# Patient Record
Sex: MALE | Race: OTHER | URBAN - METROPOLITAN AREA
[De-identification: names, ages, dates, MRNs, and addresses within clinical notes are randomized per-mention and may not be internally consistent; named-entity substitution may affect disease eponyms.]

---

## 2017-02-22 PROBLEM — Z00.00 ENCOUNTER FOR PREVENTIVE HEALTH EXAMINATION: Status: ACTIVE | Noted: 2017-02-22

## 2017-03-16 ENCOUNTER — OUTPATIENT (OUTPATIENT)
Dept: OUTPATIENT SERVICES | Facility: HOSPITAL | Age: 57
LOS: 1 days | Discharge: HOME | End: 2017-03-16

## 2017-03-23 ENCOUNTER — APPOINTMENT (OUTPATIENT)
Dept: SURGERY | Facility: CLINIC | Age: 57
End: 2017-03-23

## 2017-06-27 DIAGNOSIS — R52 PAIN, UNSPECIFIED: ICD-10-CM

## 2017-10-31 ENCOUNTER — OUTPATIENT (OUTPATIENT)
Dept: OUTPATIENT SERVICES | Facility: HOSPITAL | Age: 57
LOS: 1 days | Discharge: HOME | End: 2017-10-31

## 2017-10-31 DIAGNOSIS — K86.2 CYST OF PANCREAS: ICD-10-CM

## 2017-11-27 ENCOUNTER — APPOINTMENT (OUTPATIENT)
Dept: SURGERY | Facility: CLINIC | Age: 57
End: 2017-11-27

## 2019-11-06 ENCOUNTER — OUTPATIENT (OUTPATIENT)
Dept: OUTPATIENT SERVICES | Facility: HOSPITAL | Age: 59
LOS: 1 days | Discharge: HOME | End: 2019-11-06
Payer: COMMERCIAL

## 2019-11-06 DIAGNOSIS — M54.42 LUMBAGO WITH SCIATICA, LEFT SIDE: ICD-10-CM

## 2019-11-06 PROCEDURE — 72110 X-RAY EXAM L-2 SPINE 4/>VWS: CPT | Mod: 26

## 2019-11-14 ENCOUNTER — OUTPATIENT (OUTPATIENT)
Dept: OUTPATIENT SERVICES | Facility: HOSPITAL | Age: 59
LOS: 1 days | Discharge: HOME | End: 2019-11-14
Payer: COMMERCIAL

## 2019-11-14 DIAGNOSIS — Z87.442 PERSONAL HISTORY OF URINARY CALCULI: ICD-10-CM

## 2019-11-14 PROCEDURE — 76775 US EXAM ABDO BACK WALL LIM: CPT | Mod: 26

## 2021-12-21 ENCOUNTER — OUTPATIENT (OUTPATIENT)
Dept: OUTPATIENT SERVICES | Facility: HOSPITAL | Age: 61
LOS: 1 days | Discharge: HOME | End: 2021-12-21
Payer: COMMERCIAL

## 2021-12-21 DIAGNOSIS — M79.671 PAIN IN RIGHT FOOT: ICD-10-CM

## 2021-12-21 DIAGNOSIS — R10.2 PELVIC AND PERINEAL PAIN: ICD-10-CM

## 2021-12-21 PROCEDURE — 72170 X-RAY EXAM OF PELVIS: CPT | Mod: 26

## 2021-12-21 PROCEDURE — 73630 X-RAY EXAM OF FOOT: CPT | Mod: 26,RT

## 2021-12-21 PROCEDURE — 74018 RADEX ABDOMEN 1 VIEW: CPT | Mod: 26

## 2022-04-13 ENCOUNTER — OUTPATIENT (OUTPATIENT)
Dept: OUTPATIENT SERVICES | Facility: HOSPITAL | Age: 62
LOS: 1 days | Discharge: HOME | End: 2022-04-13
Payer: COMMERCIAL

## 2022-04-13 VITALS
DIASTOLIC BLOOD PRESSURE: 86 MMHG | SYSTOLIC BLOOD PRESSURE: 158 MMHG | HEART RATE: 67 BPM | HEIGHT: 68 IN | TEMPERATURE: 98 F | OXYGEN SATURATION: 97 % | RESPIRATION RATE: 15 BRPM | WEIGHT: 243.83 LBS

## 2022-04-13 DIAGNOSIS — Z90.49 ACQUIRED ABSENCE OF OTHER SPECIFIED PARTS OF DIGESTIVE TRACT: Chronic | ICD-10-CM

## 2022-04-13 DIAGNOSIS — Z01.818 ENCOUNTER FOR OTHER PREPROCEDURAL EXAMINATION: ICD-10-CM

## 2022-04-13 DIAGNOSIS — C67.9 MALIGNANT NEOPLASM OF BLADDER, UNSPECIFIED: ICD-10-CM

## 2022-04-13 DIAGNOSIS — Z90.89 ACQUIRED ABSENCE OF OTHER ORGANS: Chronic | ICD-10-CM

## 2022-04-13 LAB
A1C WITH ESTIMATED AVERAGE GLUCOSE RESULT: 8.9 % — HIGH (ref 4–5.6)
ALBUMIN SERPL ELPH-MCNC: 4.9 G/DL — SIGNIFICANT CHANGE UP (ref 3.5–5.2)
ALP SERPL-CCNC: 92 U/L — SIGNIFICANT CHANGE UP (ref 30–115)
ALT FLD-CCNC: 37 U/L — SIGNIFICANT CHANGE UP (ref 0–41)
ANION GAP SERPL CALC-SCNC: 16 MMOL/L — HIGH (ref 7–14)
APPEARANCE UR: CLEAR — SIGNIFICANT CHANGE UP
APTT BLD: 35 SEC — SIGNIFICANT CHANGE UP (ref 27–39.2)
AST SERPL-CCNC: 25 U/L — SIGNIFICANT CHANGE UP (ref 0–41)
BASOPHILS # BLD AUTO: 0.05 K/UL — SIGNIFICANT CHANGE UP (ref 0–0.2)
BASOPHILS NFR BLD AUTO: 0.6 % — SIGNIFICANT CHANGE UP (ref 0–1)
BILIRUB SERPL-MCNC: 0.2 MG/DL — SIGNIFICANT CHANGE UP (ref 0.2–1.2)
BILIRUB UR-MCNC: NEGATIVE — SIGNIFICANT CHANGE UP
BUN SERPL-MCNC: 16 MG/DL — SIGNIFICANT CHANGE UP (ref 10–20)
CALCIUM SERPL-MCNC: 10.6 MG/DL — HIGH (ref 8.5–10.1)
CHLORIDE SERPL-SCNC: 99 MMOL/L — SIGNIFICANT CHANGE UP (ref 98–110)
CO2 SERPL-SCNC: 25 MMOL/L — SIGNIFICANT CHANGE UP (ref 17–32)
COLOR SPEC: SIGNIFICANT CHANGE UP
CREAT SERPL-MCNC: 1.1 MG/DL — SIGNIFICANT CHANGE UP (ref 0.7–1.5)
DIFF PNL FLD: NEGATIVE — SIGNIFICANT CHANGE UP
EGFR: 75 ML/MIN/1.73M2 — SIGNIFICANT CHANGE UP
EOSINOPHIL # BLD AUTO: 0.38 K/UL — SIGNIFICANT CHANGE UP (ref 0–0.7)
EOSINOPHIL NFR BLD AUTO: 4.6 % — SIGNIFICANT CHANGE UP (ref 0–8)
ESTIMATED AVERAGE GLUCOSE: 209 MG/DL — HIGH (ref 68–114)
GLUCOSE SERPL-MCNC: 111 MG/DL — HIGH (ref 70–99)
GLUCOSE UR QL: NEGATIVE — SIGNIFICANT CHANGE UP
HCT VFR BLD CALC: 44 % — SIGNIFICANT CHANGE UP (ref 42–52)
HGB BLD-MCNC: 14.8 G/DL — SIGNIFICANT CHANGE UP (ref 14–18)
IMM GRANULOCYTES NFR BLD AUTO: 0.4 % — HIGH (ref 0.1–0.3)
INR BLD: 0.91 RATIO — SIGNIFICANT CHANGE UP (ref 0.65–1.3)
KETONES UR-MCNC: NEGATIVE — SIGNIFICANT CHANGE UP
LEUKOCYTE ESTERASE UR-ACNC: NEGATIVE — SIGNIFICANT CHANGE UP
LYMPHOCYTES # BLD AUTO: 3.65 K/UL — HIGH (ref 1.2–3.4)
LYMPHOCYTES # BLD AUTO: 44.4 % — SIGNIFICANT CHANGE UP (ref 20.5–51.1)
MCHC RBC-ENTMCNC: 28.2 PG — SIGNIFICANT CHANGE UP (ref 27–31)
MCHC RBC-ENTMCNC: 33.6 G/DL — SIGNIFICANT CHANGE UP (ref 32–37)
MCV RBC AUTO: 84 FL — SIGNIFICANT CHANGE UP (ref 80–94)
MONOCYTES # BLD AUTO: 0.69 K/UL — HIGH (ref 0.1–0.6)
MONOCYTES NFR BLD AUTO: 8.4 % — SIGNIFICANT CHANGE UP (ref 1.7–9.3)
NEUTROPHILS # BLD AUTO: 3.42 K/UL — SIGNIFICANT CHANGE UP (ref 1.4–6.5)
NEUTROPHILS NFR BLD AUTO: 41.6 % — LOW (ref 42.2–75.2)
NITRITE UR-MCNC: NEGATIVE — SIGNIFICANT CHANGE UP
NRBC # BLD: 0 /100 WBCS — SIGNIFICANT CHANGE UP (ref 0–0)
PH UR: 6 — SIGNIFICANT CHANGE UP (ref 5–8)
PLATELET # BLD AUTO: 399 K/UL — SIGNIFICANT CHANGE UP (ref 130–400)
POTASSIUM SERPL-MCNC: 4.5 MMOL/L — SIGNIFICANT CHANGE UP (ref 3.5–5)
POTASSIUM SERPL-SCNC: 4.5 MMOL/L — SIGNIFICANT CHANGE UP (ref 3.5–5)
PROT SERPL-MCNC: 8 G/DL — SIGNIFICANT CHANGE UP (ref 6–8)
PROT UR-MCNC: SIGNIFICANT CHANGE UP
PROTHROM AB SERPL-ACNC: 10.5 SEC — SIGNIFICANT CHANGE UP (ref 9.95–12.87)
RBC # BLD: 5.24 M/UL — SIGNIFICANT CHANGE UP (ref 4.7–6.1)
RBC # FLD: 12.4 % — SIGNIFICANT CHANGE UP (ref 11.5–14.5)
SODIUM SERPL-SCNC: 140 MMOL/L — SIGNIFICANT CHANGE UP (ref 135–146)
SP GR SPEC: 1.02 — SIGNIFICANT CHANGE UP (ref 1.01–1.03)
UROBILINOGEN FLD QL: SIGNIFICANT CHANGE UP
WBC # BLD: 8.22 K/UL — SIGNIFICANT CHANGE UP (ref 4.8–10.8)
WBC # FLD AUTO: 8.22 K/UL — SIGNIFICANT CHANGE UP (ref 4.8–10.8)

## 2022-04-13 PROCEDURE — 93010 ELECTROCARDIOGRAM REPORT: CPT

## 2022-04-13 NOTE — H&P PST ADULT - HISTORY OF PRESENT ILLNESS
PT PRESENTS TO PAST WITH NO SOB, CP, PALPITATIONS, DYSURIA, UTI OR URI AT PRESENT.   PT ABLE TO WALK UP 2-3 FLIGHTS OF STEPS WITH NO SOB.  AS PER THE PT, THIS IS HIS/HER COMPLETE MEDICAL AND SURGICAL HX, INCLUDING MEDICATIONS PRESCRIBED AND OVER THE COUNTER  pt denies any covid s/s, or tested positive in the past--PT AWARE OF DATE AND TIME OF COVID TESTING PRIOR TO SURGERY.   pt advised self quarantine till day of procedure  denies travel outside the [place] in the past 30 days  Anesthesia Alert  NO--Difficult Airway  NO--History of neck surgery or radiation  NO--Limited ROM of neck  NO--History of Malignant hyperthermia  NO--Personal or family history of Pseudocholinesterase deficiency  NO--Prior Anesthesia Complication  NO--Latex Allergy  NO--Loose teeth  NO--History of Rheumatoid Arthritis  NO--RUBIA  NO BLEEDING RISK  NO--Other_____

## 2022-04-13 NOTE — H&P PST ADULT - NSANTHOSAYNRD_GEN_A_CORE
No. RUBIA screening performed.  STOP BANG Legend: 0-2 = LOW Risk; 3-4 = INTERMEDIATE Risk; 5-8 = HIGH Risk
hemorrhoids

## 2022-04-13 NOTE — H&P PST ADULT - REASON FOR ADMISSION
Procedure: Cystourethroscopy, with fulguration and/or resection OF LARGE BLADDER TUMOR  Laterality: N/A Length of Procedure: 60 Minutes Anesthesia Type: General  PT REPORTS-  I HAVE A TUMOR ON MY BLADDER.  ABOUT 3 MONTHS AGO I STARTED HAVING BLOOD IN MY URINE.  PT DENIES HAVING ANY PAIN.
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
## 2022-04-13 NOTE — H&P PST ADULT - NSICDXPASTMEDICALHX_GEN_ALL_CORE_FT
PAST MEDICAL HISTORY:  DM (diabetes mellitus)     HTN (hypertension)     Hypercholesterolemia NO MEDS

## 2022-04-14 LAB
CULTURE RESULTS: NO GROWTH — SIGNIFICANT CHANGE UP
SPECIMEN SOURCE: SIGNIFICANT CHANGE UP

## 2022-04-27 ENCOUNTER — RESULT REVIEW (OUTPATIENT)
Age: 62
End: 2022-04-27

## 2022-04-27 ENCOUNTER — OUTPATIENT (OUTPATIENT)
Dept: OUTPATIENT SERVICES | Facility: HOSPITAL | Age: 62
LOS: 1 days | Discharge: HOME | End: 2022-04-27
Payer: COMMERCIAL

## 2022-04-27 ENCOUNTER — TRANSCRIPTION ENCOUNTER (OUTPATIENT)
Age: 62
End: 2022-04-27

## 2022-04-27 VITALS — HEART RATE: 78 BPM | DIASTOLIC BLOOD PRESSURE: 80 MMHG | SYSTOLIC BLOOD PRESSURE: 158 MMHG | RESPIRATION RATE: 18 BRPM

## 2022-04-27 VITALS
OXYGEN SATURATION: 98 % | TEMPERATURE: 98 F | HEART RATE: 81 BPM | RESPIRATION RATE: 18 BRPM | DIASTOLIC BLOOD PRESSURE: 87 MMHG | HEIGHT: 68 IN | WEIGHT: 240.08 LBS | SYSTOLIC BLOOD PRESSURE: 171 MMHG

## 2022-04-27 DIAGNOSIS — Z98.49 CATARACT EXTRACTION STATUS, UNSPECIFIED EYE: Chronic | ICD-10-CM

## 2022-04-27 DIAGNOSIS — Z90.49 ACQUIRED ABSENCE OF OTHER SPECIFIED PARTS OF DIGESTIVE TRACT: Chronic | ICD-10-CM

## 2022-04-27 DIAGNOSIS — Z90.89 ACQUIRED ABSENCE OF OTHER ORGANS: Chronic | ICD-10-CM

## 2022-04-27 PROCEDURE — 88307 TISSUE EXAM BY PATHOLOGIST: CPT | Mod: 26

## 2022-04-27 RX ORDER — HYDROMORPHONE HYDROCHLORIDE 2 MG/ML
0.5 INJECTION INTRAMUSCULAR; INTRAVENOUS; SUBCUTANEOUS
Refills: 0 | Status: DISCONTINUED | OUTPATIENT
Start: 2022-04-27 | End: 2022-04-27

## 2022-04-27 RX ORDER — SODIUM CHLORIDE 9 MG/ML
1000 INJECTION, SOLUTION INTRAVENOUS
Refills: 0 | Status: DISCONTINUED | OUTPATIENT
Start: 2022-04-27 | End: 2022-04-27

## 2022-04-27 RX ORDER — ONDANSETRON 8 MG/1
4 TABLET, FILM COATED ORAL ONCE
Refills: 0 | Status: DISCONTINUED | OUTPATIENT
Start: 2022-04-27 | End: 2022-04-27

## 2022-04-27 RX ADMIN — HYDROMORPHONE HYDROCHLORIDE 0.5 MILLIGRAM(S): 2 INJECTION INTRAMUSCULAR; INTRAVENOUS; SUBCUTANEOUS at 12:26

## 2022-04-27 RX ADMIN — HYDROMORPHONE HYDROCHLORIDE 0.5 MILLIGRAM(S): 2 INJECTION INTRAMUSCULAR; INTRAVENOUS; SUBCUTANEOUS at 12:10

## 2022-04-27 RX ADMIN — HYDROMORPHONE HYDROCHLORIDE 0.5 MILLIGRAM(S): 2 INJECTION INTRAMUSCULAR; INTRAVENOUS; SUBCUTANEOUS at 12:12

## 2022-04-27 RX ADMIN — SODIUM CHLORIDE 75 MILLILITER(S): 9 INJECTION, SOLUTION INTRAVENOUS at 11:54

## 2022-04-27 RX ADMIN — HYDROMORPHONE HYDROCHLORIDE 0.5 MILLIGRAM(S): 2 INJECTION INTRAMUSCULAR; INTRAVENOUS; SUBCUTANEOUS at 11:53

## 2022-04-27 NOTE — PRE-ANESTHESIA EVALUATION ADULT - NSANTHOSAYNRD_GEN_A_CORE
No. RUBIA screening performed.  STOP BANG Legend: 0-2 = LOW Risk; 3-4 = INTERMEDIATE Risk; 5-8 = HIGH Risk

## 2022-04-27 NOTE — ASU DISCHARGE PLAN (ADULT/PEDIATRIC) - CARE PROVIDER_API CALL
Jose Null)  Urology  [facility] [address] [address]  Phone: [phone]  Fax: [phone]  Follow Up Time:

## 2022-04-27 NOTE — ASU PATIENT PROFILE, ADULT - FALL HARM RISK - HARM RISK INTERVENTIONS

## 2022-04-27 NOTE — BRIEF OPERATIVE NOTE - NSICDXBRIEFPOSTOP_GEN_ALL_CORE_FT
POST-OP DIAGNOSIS:  Gross hematuria 27-Apr-2022 11:36:29  Jose Null  Bladder cancer 27-Apr-2022 11:36:41  Jose Null

## 2022-04-27 NOTE — ASU PATIENT PROFILE, ADULT - NSICDXPASTSURGICALHX_GEN_ALL_CORE_FT
PAST SURGICAL HISTORY:  H/O cataract extraction b/l eyes with b/l iol's    History of appendectomy     History of cholecystectomy     History of tonsillectomy

## 2022-04-27 NOTE — BRIEF OPERATIVE NOTE - NSICDXBRIEFPREOP_GEN_ALL_CORE_FT
PRE-OP DIAGNOSIS:  Gross hematuria 27-Apr-2022 11:35:51  Jose Null  Bladder cancer 27-Apr-2022 11:36:16  Jose Null

## 2022-04-27 NOTE — ASU DISCHARGE PLAN (ADULT/PEDIATRIC) - ASU DC SPECIAL INSTRUCTIONSFT
You may experience urinary frequency and urgency, some blood in your urine, lower abdominal cramps.  Take your Rx as prescribed

## 2022-04-28 LAB — GLUCOSE BLDC GLUCOMTR-MCNC: 125 MG/DL — HIGH (ref 70–99)

## 2022-04-29 LAB — SURGICAL PATHOLOGY STUDY: SIGNIFICANT CHANGE UP

## 2022-05-04 DIAGNOSIS — D09.0 CARCINOMA IN SITU OF BLADDER: ICD-10-CM

## 2022-05-04 DIAGNOSIS — C67.9 MALIGNANT NEOPLASM OF BLADDER, UNSPECIFIED: ICD-10-CM

## 2022-05-04 DIAGNOSIS — R31.0 GROSS HEMATURIA: ICD-10-CM

## 2022-05-04 DIAGNOSIS — I10 ESSENTIAL (PRIMARY) HYPERTENSION: ICD-10-CM

## 2022-05-04 DIAGNOSIS — E11.9 TYPE 2 DIABETES MELLITUS WITHOUT COMPLICATIONS: ICD-10-CM

## 2022-05-04 DIAGNOSIS — Z79.84 LONG TERM (CURRENT) USE OF ORAL HYPOGLYCEMIC DRUGS: ICD-10-CM

## 2022-05-04 DIAGNOSIS — Z90.49 ACQUIRED ABSENCE OF OTHER SPECIFIED PARTS OF DIGESTIVE TRACT: ICD-10-CM

## 2022-05-04 DIAGNOSIS — E78.00 PURE HYPERCHOLESTEROLEMIA, UNSPECIFIED: ICD-10-CM

## 2023-03-31 ENCOUNTER — OUTPATIENT (OUTPATIENT)
Dept: OUTPATIENT SERVICES | Facility: HOSPITAL | Age: 63
LOS: 1 days | End: 2023-03-31
Payer: COMMERCIAL

## 2023-03-31 DIAGNOSIS — Z90.89 ACQUIRED ABSENCE OF OTHER ORGANS: Chronic | ICD-10-CM

## 2023-03-31 DIAGNOSIS — Z98.49 CATARACT EXTRACTION STATUS, UNSPECIFIED EYE: Chronic | ICD-10-CM

## 2023-03-31 DIAGNOSIS — N21.0 CALCULUS IN BLADDER: ICD-10-CM

## 2023-03-31 DIAGNOSIS — Z90.49 ACQUIRED ABSENCE OF OTHER SPECIFIED PARTS OF DIGESTIVE TRACT: Chronic | ICD-10-CM

## 2023-03-31 DIAGNOSIS — N20.0 CALCULUS OF KIDNEY: ICD-10-CM

## 2023-03-31 PROBLEM — E11.9 TYPE 2 DIABETES MELLITUS WITHOUT COMPLICATIONS: Chronic | Status: ACTIVE | Noted: 2022-04-13

## 2023-03-31 PROBLEM — I10 ESSENTIAL (PRIMARY) HYPERTENSION: Chronic | Status: ACTIVE | Noted: 2022-04-13

## 2023-03-31 PROCEDURE — 74019 RADEX ABDOMEN 2 VIEWS: CPT | Mod: 26

## 2023-03-31 PROCEDURE — 74019 RADEX ABDOMEN 2 VIEWS: CPT

## 2023-04-01 DIAGNOSIS — N20.0 CALCULUS OF KIDNEY: ICD-10-CM

## 2023-04-01 DIAGNOSIS — N21.0 CALCULUS IN BLADDER: ICD-10-CM

## 2023-08-08 ENCOUNTER — INPATIENT (INPATIENT)
Facility: HOSPITAL | Age: 63
LOS: 2 days | Discharge: HOME | DRG: 689 | End: 2023-08-11
Attending: INTERNAL MEDICINE | Admitting: HOSPITALIST
Payer: COMMERCIAL

## 2023-08-08 VITALS
HEART RATE: 82 BPM | HEIGHT: 68 IN | WEIGHT: 231.93 LBS | DIASTOLIC BLOOD PRESSURE: 74 MMHG | RESPIRATION RATE: 18 BRPM | SYSTOLIC BLOOD PRESSURE: 132 MMHG | TEMPERATURE: 99 F | OXYGEN SATURATION: 99 %

## 2023-08-08 DIAGNOSIS — Z90.49 ACQUIRED ABSENCE OF OTHER SPECIFIED PARTS OF DIGESTIVE TRACT: Chronic | ICD-10-CM

## 2023-08-08 DIAGNOSIS — Z98.49 CATARACT EXTRACTION STATUS, UNSPECIFIED EYE: Chronic | ICD-10-CM

## 2023-08-08 DIAGNOSIS — N39.0 URINARY TRACT INFECTION, SITE NOT SPECIFIED: ICD-10-CM

## 2023-08-08 DIAGNOSIS — Z90.89 ACQUIRED ABSENCE OF OTHER ORGANS: Chronic | ICD-10-CM

## 2023-08-08 LAB
ALBUMIN SERPL ELPH-MCNC: 3.6 G/DL — SIGNIFICANT CHANGE UP (ref 3.5–5.2)
ALP SERPL-CCNC: 85 U/L — SIGNIFICANT CHANGE UP (ref 30–115)
ALT FLD-CCNC: 46 U/L — HIGH (ref 0–41)
ANION GAP SERPL CALC-SCNC: 19 MMOL/L — HIGH (ref 7–14)
APPEARANCE UR: CLEAR — SIGNIFICANT CHANGE UP
AST SERPL-CCNC: 44 U/L — HIGH (ref 0–41)
B-OH-BUTYR SERPL-SCNC: 0.6 MMOL/L — HIGH
BACTERIA # UR AUTO: ABNORMAL /HPF
BASE EXCESS BLDV CALC-SCNC: -2.5 MMOL/L — LOW (ref -2–3)
BASOPHILS # BLD AUTO: 0.02 K/UL — SIGNIFICANT CHANGE UP (ref 0–0.2)
BASOPHILS NFR BLD AUTO: 0.2 % — SIGNIFICANT CHANGE UP (ref 0–1)
BILIRUB SERPL-MCNC: 0.3 MG/DL — SIGNIFICANT CHANGE UP (ref 0.2–1.2)
BILIRUB UR-MCNC: NEGATIVE — SIGNIFICANT CHANGE UP
BUN SERPL-MCNC: 27 MG/DL — HIGH (ref 10–20)
CA-I SERPL-SCNC: 1.13 MMOL/L — LOW (ref 1.15–1.33)
CALCIUM SERPL-MCNC: 9.1 MG/DL — SIGNIFICANT CHANGE UP (ref 8.4–10.5)
CAST: 6 /LPF — HIGH (ref 0–4)
CHLORIDE SERPL-SCNC: 91 MMOL/L — LOW (ref 98–110)
CO2 SERPL-SCNC: 20 MMOL/L — SIGNIFICANT CHANGE UP (ref 17–32)
COLOR SPEC: SIGNIFICANT CHANGE UP
CREAT SERPL-MCNC: 1.4 MG/DL — SIGNIFICANT CHANGE UP (ref 0.7–1.5)
DIFF PNL FLD: ABNORMAL
EGFR: 56 ML/MIN/1.73M2 — LOW
EOSINOPHIL # BLD AUTO: 0.09 K/UL — SIGNIFICANT CHANGE UP (ref 0–0.7)
EOSINOPHIL NFR BLD AUTO: 1 % — SIGNIFICANT CHANGE UP (ref 0–8)
GAS PNL BLDV: 125 MMOL/L — LOW (ref 136–145)
GAS PNL BLDV: SIGNIFICANT CHANGE UP
GAS PNL BLDV: SIGNIFICANT CHANGE UP
GLUCOSE BLDC GLUCOMTR-MCNC: 103 MG/DL — HIGH (ref 70–99)
GLUCOSE BLDC GLUCOMTR-MCNC: 118 MG/DL — HIGH (ref 70–99)
GLUCOSE BLDC GLUCOMTR-MCNC: 161 MG/DL — HIGH (ref 70–99)
GLUCOSE BLDC GLUCOMTR-MCNC: 240 MG/DL — HIGH (ref 70–99)
GLUCOSE BLDC GLUCOMTR-MCNC: 321 MG/DL — HIGH (ref 70–99)
GLUCOSE SERPL-MCNC: 498 MG/DL — CRITICAL HIGH (ref 70–99)
GLUCOSE UR QL: >=1000 MG/DL
HCO3 BLDV-SCNC: 24 MMOL/L — SIGNIFICANT CHANGE UP (ref 22–29)
HCT VFR BLD CALC: 36.7 % — LOW (ref 42–52)
HCT VFR BLDA CALC: 36 % — LOW (ref 39–51)
HGB BLD CALC-MCNC: 12.1 G/DL — LOW (ref 12.6–17.4)
HGB BLD-MCNC: 11.9 G/DL — LOW (ref 14–18)
HYALINE CASTS # UR AUTO: 6 /LPF — HIGH (ref 0–4)
IMM GRANULOCYTES NFR BLD AUTO: 0.7 % — HIGH (ref 0.1–0.3)
KETONES UR-MCNC: ABNORMAL MG/DL
LACTATE BLDV-MCNC: 4.4 MMOL/L — CRITICAL HIGH (ref 0.5–2)
LEUKOCYTE ESTERASE UR-ACNC: NEGATIVE — SIGNIFICANT CHANGE UP
LYMPHOCYTES # BLD AUTO: 1.33 K/UL — SIGNIFICANT CHANGE UP (ref 1.2–3.4)
LYMPHOCYTES # BLD AUTO: 14.9 % — LOW (ref 20.5–51.1)
MAGNESIUM SERPL-MCNC: 2.2 MG/DL — SIGNIFICANT CHANGE UP (ref 1.8–2.4)
MCHC RBC-ENTMCNC: 27.7 PG — SIGNIFICANT CHANGE UP (ref 27–31)
MCHC RBC-ENTMCNC: 32.4 G/DL — SIGNIFICANT CHANGE UP (ref 32–37)
MCV RBC AUTO: 85.3 FL — SIGNIFICANT CHANGE UP (ref 80–94)
MONOCYTES # BLD AUTO: 0.48 K/UL — SIGNIFICANT CHANGE UP (ref 0.1–0.6)
MONOCYTES NFR BLD AUTO: 5.4 % — SIGNIFICANT CHANGE UP (ref 1.7–9.3)
NEUTROPHILS # BLD AUTO: 6.95 K/UL — HIGH (ref 1.4–6.5)
NEUTROPHILS NFR BLD AUTO: 77.8 % — HIGH (ref 42.2–75.2)
NITRITE UR-MCNC: POSITIVE
NRBC # BLD: 0 /100 WBCS — SIGNIFICANT CHANGE UP (ref 0–0)
OSMOLALITY SERPL: 304 MOS/KG — HIGH (ref 280–301)
PCO2 BLDV: 51 MMHG — SIGNIFICANT CHANGE UP (ref 42–55)
PH BLDV: 7.29 — LOW (ref 7.32–7.43)
PH UR: 6 — SIGNIFICANT CHANGE UP (ref 5–8)
PHOSPHATE SERPL-MCNC: 3.5 MG/DL — SIGNIFICANT CHANGE UP (ref 2.1–4.9)
PLATELET # BLD AUTO: 363 K/UL — SIGNIFICANT CHANGE UP (ref 130–400)
PMV BLD: 11.8 FL — HIGH (ref 7.4–10.4)
PO2 BLDV: 22 MMHG — SIGNIFICANT CHANGE UP
POTASSIUM BLDV-SCNC: 4.9 MMOL/L — SIGNIFICANT CHANGE UP (ref 3.5–5.1)
POTASSIUM SERPL-MCNC: 5 MMOL/L — SIGNIFICANT CHANGE UP (ref 3.5–5)
POTASSIUM SERPL-SCNC: 5 MMOL/L — SIGNIFICANT CHANGE UP (ref 3.5–5)
PROT SERPL-MCNC: 7.4 G/DL — SIGNIFICANT CHANGE UP (ref 6–8)
PROT UR-MCNC: 30 MG/DL
RBC # BLD: 4.3 M/UL — LOW (ref 4.7–6.1)
RBC # FLD: 13.2 % — SIGNIFICANT CHANGE UP (ref 11.5–14.5)
RBC CASTS # UR COMP ASSIST: 5 /HPF — HIGH (ref 0–4)
SAO2 % BLDV: 27.4 % — SIGNIFICANT CHANGE UP
SODIUM SERPL-SCNC: 130 MMOL/L — LOW (ref 135–146)
SP GR SPEC: >1.03 — HIGH (ref 1–1.03)
SQUAMOUS # UR AUTO: 2 /HPF — SIGNIFICANT CHANGE UP (ref 0–5)
UROBILINOGEN FLD QL: 1 MG/DL — SIGNIFICANT CHANGE UP (ref 0.2–1)
WBC # BLD: 8.93 K/UL — SIGNIFICANT CHANGE UP (ref 4.8–10.8)
WBC # FLD AUTO: 8.93 K/UL — SIGNIFICANT CHANGE UP (ref 4.8–10.8)
WBC UR QL: 10 /HPF — HIGH (ref 0–5)

## 2023-08-08 PROCEDURE — 80053 COMPREHEN METABOLIC PANEL: CPT

## 2023-08-08 PROCEDURE — 83036 HEMOGLOBIN GLYCOSYLATED A1C: CPT

## 2023-08-08 PROCEDURE — 99291 CRITICAL CARE FIRST HOUR: CPT

## 2023-08-08 PROCEDURE — 82962 GLUCOSE BLOOD TEST: CPT

## 2023-08-08 PROCEDURE — 76770 US EXAM ABDO BACK WALL COMP: CPT

## 2023-08-08 PROCEDURE — 84540 ASSAY OF URINE/UREA-N: CPT

## 2023-08-08 PROCEDURE — 82010 KETONE BODYS QUAN: CPT

## 2023-08-08 PROCEDURE — 84300 ASSAY OF URINE SODIUM: CPT

## 2023-08-08 PROCEDURE — 80061 LIPID PANEL: CPT

## 2023-08-08 PROCEDURE — 84156 ASSAY OF PROTEIN URINE: CPT

## 2023-08-08 PROCEDURE — 86900 BLOOD TYPING SEROLOGIC ABO: CPT

## 2023-08-08 PROCEDURE — 83930 ASSAY OF BLOOD OSMOLALITY: CPT

## 2023-08-08 PROCEDURE — 99283 EMERGENCY DEPT VISIT LOW MDM: CPT

## 2023-08-08 PROCEDURE — 83935 ASSAY OF URINE OSMOLALITY: CPT

## 2023-08-08 PROCEDURE — 82570 ASSAY OF URINE CREATININE: CPT

## 2023-08-08 PROCEDURE — 85025 COMPLETE CBC W/AUTO DIFF WBC: CPT

## 2023-08-08 PROCEDURE — 86901 BLOOD TYPING SEROLOGIC RH(D): CPT

## 2023-08-08 PROCEDURE — 36415 COLL VENOUS BLD VENIPUNCTURE: CPT

## 2023-08-08 PROCEDURE — 84133 ASSAY OF URINE POTASSIUM: CPT

## 2023-08-08 PROCEDURE — 86850 RBC ANTIBODY SCREEN: CPT

## 2023-08-08 PROCEDURE — 83735 ASSAY OF MAGNESIUM: CPT

## 2023-08-08 PROCEDURE — 86803 HEPATITIS C AB TEST: CPT

## 2023-08-08 RX ORDER — LANOLIN ALCOHOL/MO/W.PET/CERES
3 CREAM (GRAM) TOPICAL AT BEDTIME
Refills: 0 | Status: DISCONTINUED | OUTPATIENT
Start: 2023-08-08 | End: 2023-08-11

## 2023-08-08 RX ORDER — METFORMIN HYDROCHLORIDE 850 MG/1
1 TABLET ORAL
Qty: 0 | Refills: 0 | DISCHARGE

## 2023-08-08 RX ORDER — PANTOPRAZOLE SODIUM 20 MG/1
40 TABLET, DELAYED RELEASE ORAL
Refills: 0 | Status: DISCONTINUED | OUTPATIENT
Start: 2023-08-08 | End: 2023-08-09

## 2023-08-08 RX ORDER — ACETAMINOPHEN 500 MG
650 TABLET ORAL EVERY 6 HOURS
Refills: 0 | Status: DISCONTINUED | OUTPATIENT
Start: 2023-08-08 | End: 2023-08-11

## 2023-08-08 RX ORDER — CEFEPIME 1 G/1
2000 INJECTION, POWDER, FOR SOLUTION INTRAMUSCULAR; INTRAVENOUS ONCE
Refills: 0 | Status: COMPLETED | OUTPATIENT
Start: 2023-08-08 | End: 2023-08-08

## 2023-08-08 RX ORDER — INSULIN HUMAN 100 [IU]/ML
5 INJECTION, SOLUTION SUBCUTANEOUS
Qty: 100 | Refills: 0 | Status: DISCONTINUED | OUTPATIENT
Start: 2023-08-08 | End: 2023-08-09

## 2023-08-08 RX ORDER — SODIUM CHLORIDE 9 MG/ML
1000 INJECTION INTRAMUSCULAR; INTRAVENOUS; SUBCUTANEOUS ONCE
Refills: 0 | Status: COMPLETED | OUTPATIENT
Start: 2023-08-08 | End: 2023-08-08

## 2023-08-08 RX ORDER — SODIUM CHLORIDE 9 MG/ML
1000 INJECTION INTRAMUSCULAR; INTRAVENOUS; SUBCUTANEOUS
Refills: 0 | Status: DISCONTINUED | OUTPATIENT
Start: 2023-08-08 | End: 2023-08-09

## 2023-08-08 RX ORDER — INSULIN HUMAN 100 [IU]/ML
10 INJECTION, SOLUTION SUBCUTANEOUS
Qty: 100 | Refills: 0 | Status: DISCONTINUED | OUTPATIENT
Start: 2023-08-08 | End: 2023-08-08

## 2023-08-08 RX ORDER — CIPROFLOXACIN LACTATE 400MG/40ML
1 VIAL (ML) INTRAVENOUS
Refills: 0 | DISCHARGE

## 2023-08-08 RX ORDER — HEPARIN SODIUM 5000 [USP'U]/ML
5000 INJECTION INTRAVENOUS; SUBCUTANEOUS EVERY 12 HOURS
Refills: 0 | Status: DISCONTINUED | OUTPATIENT
Start: 2023-08-08 | End: 2023-08-09

## 2023-08-08 RX ORDER — SITAGLIPTIN 50 MG/1
1 TABLET, FILM COATED ORAL
Qty: 0 | Refills: 0 | DISCHARGE

## 2023-08-08 RX ORDER — CEFTRIAXONE 500 MG/1
1000 INJECTION, POWDER, FOR SOLUTION INTRAMUSCULAR; INTRAVENOUS EVERY 24 HOURS
Refills: 0 | Status: DISCONTINUED | OUTPATIENT
Start: 2023-08-08 | End: 2023-08-09

## 2023-08-08 RX ORDER — SODIUM CHLORIDE 9 MG/ML
1000 INJECTION, SOLUTION INTRAVENOUS ONCE
Refills: 0 | Status: COMPLETED | OUTPATIENT
Start: 2023-08-08 | End: 2023-08-08

## 2023-08-08 RX ADMIN — HEPARIN SODIUM 5000 UNIT(S): 5000 INJECTION INTRAVENOUS; SUBCUTANEOUS at 22:20

## 2023-08-08 RX ADMIN — CEFTRIAXONE 100 MILLIGRAM(S): 500 INJECTION, POWDER, FOR SOLUTION INTRAMUSCULAR; INTRAVENOUS at 22:10

## 2023-08-08 RX ADMIN — INSULIN HUMAN 10 UNIT(S)/HR: 100 INJECTION, SOLUTION SUBCUTANEOUS at 19:16

## 2023-08-08 RX ADMIN — Medication 5 MILLIGRAM(S): at 22:20

## 2023-08-08 RX ADMIN — INSULIN HUMAN 5 UNIT(S)/HR: 100 INJECTION, SOLUTION SUBCUTANEOUS at 20:46

## 2023-08-08 RX ADMIN — SODIUM CHLORIDE 100 MILLILITER(S): 9 INJECTION INTRAMUSCULAR; INTRAVENOUS; SUBCUTANEOUS at 20:45

## 2023-08-08 RX ADMIN — SODIUM CHLORIDE 1000 MILLILITER(S): 9 INJECTION INTRAMUSCULAR; INTRAVENOUS; SUBCUTANEOUS at 13:03

## 2023-08-08 RX ADMIN — SODIUM CHLORIDE 1000 MILLILITER(S): 9 INJECTION, SOLUTION INTRAVENOUS at 14:57

## 2023-08-08 RX ADMIN — Medication 3 MILLIGRAM(S): at 22:21

## 2023-08-08 RX ADMIN — CEFEPIME 100 MILLIGRAM(S): 1 INJECTION, POWDER, FOR SOLUTION INTRAMUSCULAR; INTRAVENOUS at 14:56

## 2023-08-08 NOTE — H&P ADULT - NSHPPHYSICALEXAM_GEN_ALL_CORE
CONSTITUTIONAL: Mild distress  EYES: PERRLA and symmetric, EOMI, No conjunctival or scleral injection, non-icteric  ENMT: Oral mucosa with moist membranes.    RESP: No respiratory distress, no use of accessory muscles;   CV: RRR, +S1S2, no MRG; no JVD; no peripheral edema  GI: Soft, NT,  LYMPH: No cervical LAD or tenderness; s  MSK: No digital clubbing or cyanosis  SKIN: No rashes or ulcers noted; no subcutaneous nodules or induration palpable  NEURO: CN II-XII intact; normal reflexes in upper and lower extremities, sensation intact in upper and lower extremities b/l to light touch   PSYCH: Appropriate insight/judgment; A+O x 3

## 2023-08-08 NOTE — ED PROVIDER NOTE - ATTENDING CONTRIBUTION TO CARE
64 yo M pmh of HTn, HLD, DM, bladder ca in active treatment presents with elevated FS and UTI. Patient was diagnosed with UTI 5 days ago. Has been on cipro for it. States that the symptoms have persisted. + hematuria, frequency, burning, urgency. + fever and chills. Tmax 101 today. Went to pmd Dr. Spencer who sent him to the ED for evaluation. Also found ot have elevated FS in office. no abdominal pain. no back pain. urologist is Dr. Null.       CONSTITUTIONAL: Well-developed; well-nourished; in no acute distress.   SKIN: warm, dry  HEAD: Normocephalic; atraumatic.  EYES: PERRL, EOMI, no conjunctival erythema  ENT: No nasal discharge; airway clear.  NECK: Supple; non tender.  CARD: S1, S2 normal;  Regular rate and rhythm.   RESP: No wheezes, rales or rhonchi.  ABD: soft non tender, non distended, no rebound or guarding. no cva tenderness.   EXT: Normal ROM.  5/5 strength in all 4 extremities   LYMPH: No acute cervical adenopathy.  NEURO: Alert, oriented, grossly unremarkable. neurovascularly intact  PSYCH: Cooperative, appropriate.

## 2023-08-08 NOTE — ED PROVIDER NOTE - OBJECTIVE STATEMENT
63-year-old male, with past medical history of HTN, HLD, DM type II, bladder cancer status postresection currently undergoing BCG therapy, presents to the emergency department for hyperglycemia measuring 500 at his doctor's office today.  Patient has had dysuria, hematuria, fever, chills, lower abdominal and lower back pain for 1.5 weeks.  He has finished 8 out of 10 days of Cipro without improvement of his symptoms.  Outpatient culture results show resistance to Cipro but sensitivity to cefepime.  Denies nausea, vomiting, diarrhea, chest pain, shortness of breath.

## 2023-08-08 NOTE — CONSULT NOTE ADULT - ASSESSMENT
Pt is a 64yo Male w/ PMH including DM, bladder ca (follows w/ Dr. Null, on BCG treatment - last 8/3) who presents with persistent dysuria, urgency, frequency while on Cipro PO a/w UTI, DKA.    PLAN:  -Medical admission for abx, DKA management  -C/w abx - s/p cefepime in ED  -UA noted, f/u UCx  -RBUS  -Trend fever curve  -Will d/w attending Pt is a 62yo Male w/ PMH including DM, bladder ca (follows w/ Dr. Null, on BCG treatment - last 8/3) who presents with persistent dysuria, urgency, frequency while on Cipro PO a/w UTI, DKA.    PLAN:  -Medical admission for abx, DKA management  -C/w abx - s/p cefepime in ED  -UA noted, f/u UCx  -RBUS  -Trend fever curve  -Will d/w attending  ADDENDUM: Pt seen and examined by Dr Gramajo, due to urgent Operating room procedure this note will be updated later.  It is recommended to treat Urinary tract infection and pt should follow up with Dr Null to see when his next BCG treatment will be Pt is a 64yo Male w/ PMH including DM, bladder ca (follows w/ Dr. Null, on BCG treatment - last 8/3) who presents with persistent dysuria, urgency, frequency while on Cipro PO a/w UTI, DKA.    PLAN:  -Medical admission for abx, DKA management  -C/w abx - s/p cefepime in ED  -UA noted, f/u UCx  -RBUS  -Trend fever curve  -Will d/w attending  ADDENDUM: Pt seen and examined by Dr Gramajo, due to urgent Operating room procedure this note will be updated later.  It is recommended to treat Urinary tract infection and pt should follow up with Dr Null to see when his next BCG treatment will be      Patient was seen in the cardiac care unit On the evening of August 9  where he is being monitored for his DKA neurologically he is stable the urine is now clear and he said the dysuria is much improved.  Antibiotics should be treated according to the culture and ID recommendations if more acute urologic care is needed as an inpatient please call us otherwise he should follow-up with Dr. Null his primary urologist

## 2023-08-08 NOTE — ED ADULT NURSE NOTE - NSFALLUNIVINTERV_ED_ALL_ED
Bed/Stretcher in lowest position, wheels locked, appropriate side rails in place/Call bell, personal items and telephone in reach/Instruct patient to call for assistance before getting out of bed/chair/stretcher/Non-slip footwear applied when patient is off stretcher/[place] to call system/Physically safe environment - no spills, clutter or unnecessary equipment/Purposeful proactive rounding/Room/bathroom lighting operational, light cord in reach

## 2023-08-08 NOTE — ED PROVIDER NOTE - CARE PLAN
1 Principal Discharge DX:	DKA (diabetic ketoacidosis)  Secondary Diagnosis:	Complicated UTI (urinary tract infection)  Secondary Diagnosis:	Bladder cancer

## 2023-08-08 NOTE — PATIENT PROFILE ADULT - FALL HARM RISK - HARM RISK INTERVENTIONS

## 2023-08-08 NOTE — H&P ADULT - ATTENDING COMMENTS
KIKA SAMANIEGO is a 63y man with a medical history significant for urothelial cancer treated with BCG and diabetes who presented initially with dysuria and frequency unable to be managed with outpatient antibiotic therapy, and is now in the critical care unit for mild DKA.    Impression    Mild DKA  Acute lactic acidosis  - likely secondary to metformin in the setting of renal impairment  Prerenal azotemia   Urinary tract infection  Hypertension  Pseudohyponatremia    Plan:      CNS:  No acute concerns, at baseline    HEENT:  Oral care    CARDIOVASCULAR  home antihypertensives    PULMONARY  HOB @ 45 degrees, aspiration precautions  Tolerating RA appropriately, no acute concerns    GASTROINTESTINAL  GI prophylaxis: not indicated  Feeds: diabetic diet  BM: regimen PRN    GENITOURINARY/RENAL  Prerenal azotemia now resolved  Continue to monitor lytes/creatinine  Continue LR resuscitation  Pseudohyponatremia resolved    INFECTIOUS  Complete 5-7 day course of CTX  Follow-up BCx, UCx  Appreciate ID recommendations    HEMATOLOGIC  DVT ppx: lovenox SQ    ENDOCRINE  Completed insulin gtt protocol for DKA  Start basal bolus, consult endocrine  Follow up FS.  Insulin protocol as needed.  BG goal 140-180    MSK/DERM  OOBAT      CODE STATUS: FULL  LINES: none  DISPO: GMF

## 2023-08-08 NOTE — H&P ADULT - NSHPLABSRESULTS_GEN_ALL_CORE
LABS:                         11.9   8.93  )-----------( 363      ( 08 Aug 2023 13:23 )             36.7     08-08    130<L>  |  91<L>  |  27<H>  ----------------------------<  498<HH>  5.0   |  20  |  1.4    Ca    9.1      08 Aug 2023 13:23  Phos  3.5     08-08  Mg     2.2     08-08    TPro  7.4  /  Alb  3.6  /  TBili  0.3  /  DBili  x   /  AST  44<H>  /  ALT  46<H>  /  AlkPhos  85  08-08      Urinalysis Basic - ( 08 Aug 2023 13:23 )    Color: Dark Yellow / Appearance: Clear / SG: >1.030 / pH: x  Gluc: 498 mg/dL / Ketone: Trace mg/dL  / Bili: Negative / Urobili: 1.0 mg/dL   Blood: x / Protein: 30 mg/dL / Nitrite: Positive   Leuk Esterase: Negative / RBC: 5 /HPF / WBC 10 /HPF   Sq Epi: x / Non Sq Epi: 2 /HPF / Bacteria: Moderate /HPF                RADIOLOGY, EKG & ADDITIONAL TESTS: Reviewed.

## 2023-08-08 NOTE — CONSULT NOTE ADULT - NS ATTEND AMEND GEN_ALL_CORE FT
I personally saw and examined the patient, reviewed the chart and available data. I discussed the situation with the patient and  PA as well as bedside nurse. I also reviewed and/or amended the note as necessary.    patient seen on the day in question. Note not reviewed and cosigned until now due to scheduling issues

## 2023-08-08 NOTE — H&P ADULT - ASSESSMENT
A 63 Y/M with Pmhx of HTN, HLD, DM, papillary urothelial bladder cancer s/p tranurethral resection(6/2022) and BCG treatment(last cycle on 7/27/2023) presented to ED with the complains of burning urination with increased frequency. The patient went to visit his PCP(8 days back) with these complains and was found to have UTI and was started on Ciprofloxacin. Today the patient had fever of 101 F and went to follow up with his PCP and was found to have increased serum glucose level, therefore was sent to ED.    #IMPRESSION  -Recurrent UTI  -HAGMA  -DKA  -Lactic acidosis  -Hyponatremia  -Transaminitis  -H/O DM, HTN, DLD    #PLAN     CNS: Avoid CNS depressant    HEENT: Oral care    CARDIOVASCULAR: EKG NSR, monitor BP.    PULMONARY: HOB @ 45 degrees, aspiration precautions, O2 supplementation prn    GASTROINTESTINAL: GI prophylaxis: PPI, Carb Consistent diet. Trend LFT.    GENITOURINARY/RENAL: Trend BMP, F/U RBUS, will continue with maintenance fluid.    INFECTIOUS: Continue with rocephin for now, F/U urine cx, F/U blood cx. F/U ID     HEMATOLOGIC: Heparin SQ for DVT ppx.    ENDOCRINE: Monitor FS q1hr.  Insulin Drip now.  BG goal 140-180. Switch to SQ insulin once DKA resolves. F/U HbA1c and lipid profile in the AM.    MSK/DERM: Activity as tolerated.    #MISC:  -GI ppx: PPI  -DVT ppx: Heparin sq  -Diet: DASH/TLC/CC  -Activity: IAT  -Code status: Full code  -Dispo: Acute, CCO   A 63 Y/M with Pmhx of HTN, HLD, DM, papillary urothelial bladder cancer s/p tranurethral resection(6/2022) and BCG treatment(last cycle on 7/27/2023) presented to ED with the complains of burning urination with increased frequency. The patient went to visit his PCP(8 days back) with these complains and was found to have UTI and was started on Ciprofloxacin. Today the patient had fever of 101 F and went to follow up with his PCP and was found to have increased serum glucose level, therefore was sent to ED.    #IMPRESSION  -Recurrent UTI  -HAGMA  -DKA  -Lactic acidosis  -Hyponatremia  -Transaminitis  -H/O DM, HTN, DLD    #PLAN     CNS: Avoid CNS depressant    HEENT: Oral care    CARDIOVASCULAR: EKG NSR, monitor BP.    PULMONARY: HOB @ 45 degrees, aspiration precautions, O2 supplementation prn    GASTROINTESTINAL: GI prophylaxis: PPI, Carb Consistent diet. Trend LFT.    GENITOURINARY/RENAL: Trend BMP, F/U RBUS, F/U urine studies, Follow up serum osmolalility, trend lactate, will continue with maintenance fluid.    INFECTIOUS: Continue with rocephin for now, F/U urine cx, F/U blood cx. F/U ID     HEMATOLOGIC: Heparin SQ for DVT ppx.    ENDOCRINE: Monitor FS q1hr.  Insulin Drip now.  BG goal 140-180. Switch to SQ insulin once DKA resolves. F/U HbA1c and lipid profile in the AM.    MSK/DERM: Activity as tolerated.    #MISC:  -GI ppx: PPI  -DVT ppx: Heparin sq  -Diet: DASH/TLC/CC  -Activity: IAT  -Code status: Full code  -Dispo: Acute, CCO

## 2023-08-08 NOTE — H&P ADULT - HISTORY OF PRESENT ILLNESS
A 63 Y/M with Pmhx of HTN, HLD, DM, papillary urothelial bladder cancer s/p tranurethral resection(6/2022) and BCG treatment(last cycle on 7/27/2023) presented to ED with the complains of burning urination with increased frequency. The patient went to visit his PCP(8 days back) with these complains and was found to have UTI and was started on Ciprofloxacin. Today the patient had fever of 101 F and went to follow up with his PCP and was found to have increased serum glucose level, therefore was sent to ED. No abdominal pain, vomiting, headache, SOB, chest pain, dizziness or LOC.     #ED vitals:  T(C): 36.8 (08-08-23 @ 15:41), Max: 37 (08-08-23 @ 11:38)  HR: 87 (08-08-23 @ 15:41) (82 - 87)  BP: 161/73 (08-08-23 @ 15:41) (132/74 - 161/73)  RR: 18 (08-08-23 @ 15:41) (18 - 18)  SpO2: 97% (08-08-23 @ 15:41) (97% - 99%)    #Labs significant for:  Glucose 449, Na 130, Cr 1.4(BL 1.1), AST 44, ALT 46, Anion Gap 19, Beta hydroxybutyrate 0.6, VBG: pH 7.29, Lactate 4.4, pCo2 51, UA positive    #S/P: Cefepime 2gm IV x 1, LR bolus 2L in the ED.    The patient is being admitted for the further management of UTI and DKA.

## 2023-08-08 NOTE — CONSULT NOTE ADULT - SUBJECTIVE AND OBJECTIVE BOX
Pt is a 64yo Male w/ PMH including DM, bladder ca (follows w/ Dr. Null, on BCG treatment - last 8/3) who presents with persistent dysuria, urgency, frequency. Patient states that symptoms started following last BCG treatment. Reports starting Cipro PO and has been taking medication as prescribed without improvement of symptoms. States that yesterday, he was febrile to 101F and reports chills. Also notes that his finger sticks at home have been elevated to 400s.    PAST MEDICAL & SURGICAL HISTORY:  DM (diabetes mellitus)  HTN (hypertension)  Hypercholesterolemia NO MEDS  Bladder cancer  History of cholecystectomy  History of appendectomy  History of tonsillectomy  H/O cataract extraction b/l eyes with b/l iol's    MEDICATIONS  (STANDING):  insulin regular Infusion 10 Unit(s)/Hr (10 mL/Hr) IV Continuous <Continuous>    Allergies  No Known Allergies    SOCIAL HISTORY: No illicit drug use    REVIEW OF SYSTEMS   [x] A ten-point review of systems was otherwise negative except as noted.    Vital Signs Last 24 Hrs  T(C): 36.8 (08 Aug 2023 15:41), Max: 37 (08 Aug 2023 11:38)  T(F): 98.3 (08 Aug 2023 15:41), Max: 98.6 (08 Aug 2023 11:38)  HR: 87 (08 Aug 2023 15:41) (82 - 87)  BP: 161/73 (08 Aug 2023 15:41) (132/74 - 161/73)  RR: 18 (08 Aug 2023 15:41) (18 - 18)  SpO2: 97% (08 Aug 2023 15:41) (97% - 99%)    Parameters below as of 08 Aug 2023 15:41  Patient On (Oxygen Delivery Method): room air    PHYSICAL EXAM:  GEN: NAD, awake and alert. Nontoxic appearing.  SKIN: Non-diaphoretic.  RESP: Non-labored breathing. No use of accessory muscles.  ABDO: Soft, nondistended, nontender to palpation. Unable to palpate bladder, mild suprapubic tenderness.  BACK: No CVA tenderness bilaterally.  : Voiding freely.  EXT: CERVANTES x 4.    LABS:             11.9   8.93  )-----------( 363      ( 08 Aug 2023 13:23 )             36.7     08-08  130<L>  |  91<L>  |  27<H>  ----------------------------<  498<HH>  5.0   |  20  |  1.4    Ca    9.1      08 Aug 2023 13:23  Phos  3.5     08-08  Mg     2.2     08-08    TPro  7.4  /  Alb  3.6  /  TBili  0.3  /  DBili  x   /  AST  44<H>  /  ALT  46<H>  /  AlkPhos  85  08-08    Urinalysis Basic - ( 08 Aug 2023 13:23 )  Color: Dark Yellow / Appearance: Clear / SG: >1.030 / pH: x  Gluc: 498 mg/dL / Ketone: Trace mg/dL  / Bili: Negative / Urobili: 1.0 mg/dL   Blood: x / Protein: 30 mg/dL / Nitrite: Positive   Leuk Esterase: Negative / RBC: 5 /HPF / WBC 10 /HPF   Sq Epi: x / Non Sq Epi: 2 /HPF / Bacteria: Moderate /HPF Pt is a 64yo Male w/ PMH including DM, bladder ca (follows w/ Dr. Null, on BCG treatment - last 8/3) who presents with persistent dysuria, urgency, frequency. Patient states that symptoms started following last BCG treatment. Reports starting Cipro PO and has been taking medication as prescribed without improvement of symptoms. States that yesterday, he was febrile to 101F and reports chills. Also notes that his finger sticks at home have been elevated to 400s.    PAST MEDICAL & SURGICAL HISTORY:  DM (diabetes mellitus)  HTN (hypertension)  Hypercholesterolemia NO MEDS  Bladder cancer  History of cholecystectomy  History of appendectomy  History of tonsillectomy  H/O cataract extraction b/l eyes with b/l iol's    MEDICATIONS  (STANDING):  insulin regular Infusion 10 Unit(s)/Hr (10 mL/Hr) IV Continuous <Continuous>    Allergies  No Known Allergies    SOCIAL HISTORY: No illicit drug use    REVIEW OF SYSTEMS   [x] A ten-point review of systems was otherwise negative except as noted.    Vital Signs Last 24 Hrs  T(C): 36.8 (08 Aug 2023 15:41), Max: 37 (08 Aug 2023 11:38)  T(F): 98.3 (08 Aug 2023 15:41), Max: 98.6 (08 Aug 2023 11:38)  HR: 87 (08 Aug 2023 15:41) (82 - 87)  BP: 161/73 (08 Aug 2023 15:41) (132/74 - 161/73)  RR: 18 (08 Aug 2023 15:41) (18 - 18)  SpO2: 97% (08 Aug 2023 15:41) (97% - 99%)    Parameters below as of 08 Aug 2023 15:41  Patient On (Oxygen Delivery Method): room air    PHYSICAL EXAM:  GEN: NAD, awake and alert. Nontoxic appearing.  SKIN: Non-diaphoretic.  RESP: Non-labored breathing. No use of accessory muscles.  ABDO: Soft, nondistended, nontender to palpation. Unable to palpate bladder, mild suprapubic tenderness.  BACK: No CVA tenderness bilaterally.  : Voiding freely. Testicles are nontender there is no urethral discharge  EXT: CERVANTES x 4.    LABS:             11.9   8.93  )-----------( 363      ( 08 Aug 2023 13:23 )             36.7     08-08  130<L>  |  91<L>  |  27<H>  ----------------------------<  498<HH>  5.0   |  20  |  1.4    Ca    9.1      08 Aug 2023 13:23  Phos  3.5     08-08  Mg     2.2     08-08    TPro  7.4  /  Alb  3.6  /  TBili  0.3  /  DBili  x   /  AST  44<H>  /  ALT  46<H>  /  AlkPhos  85  08-08    Urinalysis Basic - ( 08 Aug 2023 13:23 )  Color: Dark Yellow / Appearance: Clear / SG: >1.030 / pH: x  Gluc: 498 mg/dL / Ketone: Trace mg/dL  / Bili: Negative / Urobili: 1.0 mg/dL   Blood: x / Protein: 30 mg/dL / Nitrite: Positive   Leuk Esterase: Negative / RBC: 5 /HPF / WBC 10 /HPF   Sq Epi: x / Non Sq Epi: 2 /HPF / Bacteria: Moderate /HPF

## 2023-08-08 NOTE — ED ADULT NURSE NOTE - CHIEF COMPLAINT QUOTE
Patient BIBEMS for a diagnosed UTI. Patient also has an elevated glucose reading from the doctor's office 508 BGL at 1100. FS in triage is 449.

## 2023-08-08 NOTE — ED PROVIDER NOTE - CLINICAL SUMMARY MEDICAL DECISION MAKING FREE TEXT BOX
Patient presents with worsening UTI with fever and chills. Also found to have elevated FS. labs, ekg, ua done. + DKA. fluids, abx, and insuline given. Urology consulted. Patient admitted to the ICU for further management.

## 2023-08-08 NOTE — ED ADULT NURSE NOTE - NSICDXPASTMEDICALHX_GEN_ALL_CORE_FT
PAST MEDICAL HISTORY:  Bladder cancer     DM (diabetes mellitus)     HTN (hypertension)     Hypercholesterolemia NO MEDS

## 2023-08-09 LAB
A1C WITH ESTIMATED AVERAGE GLUCOSE RESULT: 9.6 % — HIGH (ref 4–5.6)
ALBUMIN SERPL ELPH-MCNC: 3 G/DL — LOW (ref 3.5–5.2)
ALP SERPL-CCNC: 60 U/L — SIGNIFICANT CHANGE UP (ref 30–115)
ALT FLD-CCNC: 40 U/L — SIGNIFICANT CHANGE UP (ref 0–41)
ANION GAP SERPL CALC-SCNC: 11 MMOL/L — SIGNIFICANT CHANGE UP (ref 7–14)
AST SERPL-CCNC: 35 U/L — SIGNIFICANT CHANGE UP (ref 0–41)
B-OH-BUTYR SERPL-SCNC: 0.5 MMOL/L — HIGH
BASOPHILS # BLD AUTO: 0.03 K/UL — SIGNIFICANT CHANGE UP (ref 0–0.2)
BASOPHILS NFR BLD AUTO: 0.4 % — SIGNIFICANT CHANGE UP (ref 0–1)
BILIRUB SERPL-MCNC: 0.3 MG/DL — SIGNIFICANT CHANGE UP (ref 0.2–1.2)
BUN SERPL-MCNC: 17 MG/DL — SIGNIFICANT CHANGE UP (ref 10–20)
CALCIUM SERPL-MCNC: 8.3 MG/DL — LOW (ref 8.4–10.4)
CHLORIDE SERPL-SCNC: 101 MMOL/L — SIGNIFICANT CHANGE UP (ref 98–110)
CHOLEST SERPL-MCNC: 103 MG/DL — SIGNIFICANT CHANGE UP
CO2 SERPL-SCNC: 23 MMOL/L — SIGNIFICANT CHANGE UP (ref 17–32)
CREAT ?TM UR-MCNC: 104 MG/DL — SIGNIFICANT CHANGE UP
CREAT SERPL-MCNC: 1 MG/DL — SIGNIFICANT CHANGE UP (ref 0.7–1.5)
CULTURE RESULTS: SIGNIFICANT CHANGE UP
EGFR: 84 ML/MIN/1.73M2 — SIGNIFICANT CHANGE UP
EOSINOPHIL # BLD AUTO: 0.12 K/UL — SIGNIFICANT CHANGE UP (ref 0–0.7)
EOSINOPHIL NFR BLD AUTO: 1.5 % — SIGNIFICANT CHANGE UP (ref 0–8)
ESTIMATED AVERAGE GLUCOSE: 229 MG/DL — HIGH (ref 68–114)
GLUCOSE BLDC GLUCOMTR-MCNC: 155 MG/DL — HIGH (ref 70–99)
GLUCOSE BLDC GLUCOMTR-MCNC: 180 MG/DL — HIGH (ref 70–99)
GLUCOSE BLDC GLUCOMTR-MCNC: 190 MG/DL — HIGH (ref 70–99)
GLUCOSE BLDC GLUCOMTR-MCNC: 196 MG/DL — HIGH (ref 70–99)
GLUCOSE BLDC GLUCOMTR-MCNC: 293 MG/DL — HIGH (ref 70–99)
GLUCOSE BLDC GLUCOMTR-MCNC: 314 MG/DL — HIGH (ref 70–99)
GLUCOSE SERPL-MCNC: 210 MG/DL — HIGH (ref 70–99)
HCT VFR BLD CALC: 29.3 % — LOW (ref 42–52)
HCV AB S/CO SERPL IA: 0.07 COI — SIGNIFICANT CHANGE UP
HCV AB SERPL-IMP: SIGNIFICANT CHANGE UP
HDLC SERPL-MCNC: 12 MG/DL — LOW
HGB BLD-MCNC: 9.8 G/DL — LOW (ref 14–18)
IMM GRANULOCYTES NFR BLD AUTO: 0.7 % — HIGH (ref 0.1–0.3)
LIPID PNL WITH DIRECT LDL SERPL: 46 MG/DL — SIGNIFICANT CHANGE UP
LYMPHOCYTES # BLD AUTO: 1.57 K/UL — SIGNIFICANT CHANGE UP (ref 1.2–3.4)
LYMPHOCYTES # BLD AUTO: 19.3 % — LOW (ref 20.5–51.1)
MAGNESIUM SERPL-MCNC: 1.9 MG/DL — SIGNIFICANT CHANGE UP (ref 1.8–2.4)
MCHC RBC-ENTMCNC: 27.7 PG — SIGNIFICANT CHANGE UP (ref 27–31)
MCHC RBC-ENTMCNC: 33.4 G/DL — SIGNIFICANT CHANGE UP (ref 32–37)
MCV RBC AUTO: 82.8 FL — SIGNIFICANT CHANGE UP (ref 80–94)
MONOCYTES # BLD AUTO: 0.63 K/UL — HIGH (ref 0.1–0.6)
MONOCYTES NFR BLD AUTO: 7.8 % — SIGNIFICANT CHANGE UP (ref 1.7–9.3)
NEUTROPHILS # BLD AUTO: 5.71 K/UL — SIGNIFICANT CHANGE UP (ref 1.4–6.5)
NEUTROPHILS NFR BLD AUTO: 70.3 % — SIGNIFICANT CHANGE UP (ref 42.2–75.2)
NON HDL CHOLESTEROL: 91 MG/DL — SIGNIFICANT CHANGE UP
NRBC # BLD: 0 /100 WBCS — SIGNIFICANT CHANGE UP (ref 0–0)
OSMOLALITY SERPL: 286 MOS/KG — SIGNIFICANT CHANGE UP (ref 280–301)
OSMOLALITY UR: 648 MOS/KG — SIGNIFICANT CHANGE UP (ref 50–1200)
PLATELET # BLD AUTO: 370 K/UL — SIGNIFICANT CHANGE UP (ref 130–400)
PMV BLD: 10.6 FL — HIGH (ref 7.4–10.4)
POTASSIUM SERPL-MCNC: 4 MMOL/L — SIGNIFICANT CHANGE UP (ref 3.5–5)
POTASSIUM SERPL-SCNC: 4 MMOL/L — SIGNIFICANT CHANGE UP (ref 3.5–5)
POTASSIUM UR-SCNC: 13 MMOL/L — SIGNIFICANT CHANGE UP
PROT ?TM UR-MCNC: 22 MG/DLG/24H — SIGNIFICANT CHANGE UP
PROT SERPL-MCNC: 6 G/DL — SIGNIFICANT CHANGE UP (ref 6–8)
PROT/CREAT UR-RTO: 0.2 RATIO — SIGNIFICANT CHANGE UP (ref 0–0.2)
RBC # BLD: 3.54 M/UL — LOW (ref 4.7–6.1)
RBC # FLD: 13.2 % — SIGNIFICANT CHANGE UP (ref 11.5–14.5)
SODIUM SERPL-SCNC: 135 MMOL/L — SIGNIFICANT CHANGE UP (ref 135–146)
SODIUM UR-SCNC: 125 MMOL/L — SIGNIFICANT CHANGE UP
SPECIMEN SOURCE: SIGNIFICANT CHANGE UP
TRIGL SERPL-MCNC: 231 MG/DL — HIGH
UUN UR-MCNC: 876 MG/DL — SIGNIFICANT CHANGE UP
WBC # BLD: 8.12 K/UL — SIGNIFICANT CHANGE UP (ref 4.8–10.8)
WBC # FLD AUTO: 8.12 K/UL — SIGNIFICANT CHANGE UP (ref 4.8–10.8)

## 2023-08-09 PROCEDURE — 99223 1ST HOSP IP/OBS HIGH 75: CPT | Mod: GC

## 2023-08-09 PROCEDURE — 76770 US EXAM ABDO BACK WALL COMP: CPT | Mod: 26

## 2023-08-09 RX ORDER — INSULIN GLARGINE 100 [IU]/ML
26 INJECTION, SOLUTION SUBCUTANEOUS AT BEDTIME
Refills: 0 | Status: DISCONTINUED | OUTPATIENT
Start: 2023-08-09 | End: 2023-08-09

## 2023-08-09 RX ORDER — DEXTROSE 50 % IN WATER 50 %
12.5 SYRINGE (ML) INTRAVENOUS ONCE
Refills: 0 | Status: DISCONTINUED | OUTPATIENT
Start: 2023-08-09 | End: 2023-08-11

## 2023-08-09 RX ORDER — INSULIN GLARGINE 100 [IU]/ML
28 INJECTION, SOLUTION SUBCUTANEOUS AT BEDTIME
Refills: 0 | Status: DISCONTINUED | OUTPATIENT
Start: 2023-08-09 | End: 2023-08-10

## 2023-08-09 RX ORDER — SODIUM CHLORIDE 9 MG/ML
1000 INJECTION, SOLUTION INTRAVENOUS
Refills: 0 | Status: DISCONTINUED | OUTPATIENT
Start: 2023-08-09 | End: 2023-08-10

## 2023-08-09 RX ORDER — SODIUM CHLORIDE 9 MG/ML
1000 INJECTION, SOLUTION INTRAVENOUS
Refills: 0 | Status: DISCONTINUED | OUTPATIENT
Start: 2023-08-09 | End: 2023-08-11

## 2023-08-09 RX ORDER — DEXTROSE 50 % IN WATER 50 %
15 SYRINGE (ML) INTRAVENOUS ONCE
Refills: 0 | Status: DISCONTINUED | OUTPATIENT
Start: 2023-08-09 | End: 2023-08-11

## 2023-08-09 RX ORDER — MEROPENEM 1 G/30ML
1000 INJECTION INTRAVENOUS ONCE
Refills: 0 | Status: COMPLETED | OUTPATIENT
Start: 2023-08-09 | End: 2023-08-09

## 2023-08-09 RX ORDER — INSULIN LISPRO 100/ML
8 VIAL (ML) SUBCUTANEOUS
Refills: 0 | Status: DISCONTINUED | OUTPATIENT
Start: 2023-08-09 | End: 2023-08-09

## 2023-08-09 RX ORDER — INSULIN LISPRO 100/ML
10 VIAL (ML) SUBCUTANEOUS
Refills: 0 | Status: DISCONTINUED | OUTPATIENT
Start: 2023-08-09 | End: 2023-08-10

## 2023-08-09 RX ORDER — DEXTROSE 50 % IN WATER 50 %
25 SYRINGE (ML) INTRAVENOUS ONCE
Refills: 0 | Status: DISCONTINUED | OUTPATIENT
Start: 2023-08-09 | End: 2023-08-11

## 2023-08-09 RX ORDER — MEROPENEM 1 G/30ML
INJECTION INTRAVENOUS
Refills: 0 | Status: DISCONTINUED | OUTPATIENT
Start: 2023-08-09 | End: 2023-08-11

## 2023-08-09 RX ORDER — MEROPENEM 1 G/30ML
1000 INJECTION INTRAVENOUS EVERY 8 HOURS
Refills: 0 | Status: DISCONTINUED | OUTPATIENT
Start: 2023-08-09 | End: 2023-08-11

## 2023-08-09 RX ORDER — ENOXAPARIN SODIUM 100 MG/ML
40 INJECTION SUBCUTANEOUS EVERY 24 HOURS
Refills: 0 | Status: DISCONTINUED | OUTPATIENT
Start: 2023-08-09 | End: 2023-08-11

## 2023-08-09 RX ORDER — GLUCAGON INJECTION, SOLUTION 0.5 MG/.1ML
1 INJECTION, SOLUTION SUBCUTANEOUS ONCE
Refills: 0 | Status: DISCONTINUED | OUTPATIENT
Start: 2023-08-09 | End: 2023-08-11

## 2023-08-09 RX ORDER — INSULIN LISPRO 100/ML
VIAL (ML) SUBCUTANEOUS
Refills: 0 | Status: DISCONTINUED | OUTPATIENT
Start: 2023-08-09 | End: 2023-08-11

## 2023-08-09 RX ORDER — CHLORHEXIDINE GLUCONATE 213 G/1000ML
1 SOLUTION TOPICAL
Refills: 0 | Status: DISCONTINUED | OUTPATIENT
Start: 2023-08-09 | End: 2023-08-11

## 2023-08-09 RX ADMIN — ENOXAPARIN SODIUM 40 MILLIGRAM(S): 100 INJECTION SUBCUTANEOUS at 12:04

## 2023-08-09 RX ADMIN — Medication 650 MILLIGRAM(S): at 16:41

## 2023-08-09 RX ADMIN — HEPARIN SODIUM 5000 UNIT(S): 5000 INJECTION INTRAVENOUS; SUBCUTANEOUS at 05:50

## 2023-08-09 RX ADMIN — INSULIN GLARGINE 28 UNIT(S): 100 INJECTION, SOLUTION SUBCUTANEOUS at 21:08

## 2023-08-09 RX ADMIN — Medication 1: at 07:51

## 2023-08-09 RX ADMIN — SODIUM CHLORIDE 100 MILLILITER(S): 9 INJECTION, SOLUTION INTRAVENOUS at 09:17

## 2023-08-09 RX ADMIN — Medication 8 UNIT(S): at 07:51

## 2023-08-09 RX ADMIN — Medication 3: at 12:04

## 2023-08-09 RX ADMIN — Medication 3 MILLIGRAM(S): at 22:57

## 2023-08-09 RX ADMIN — CHLORHEXIDINE GLUCONATE 1 APPLICATION(S): 213 SOLUTION TOPICAL at 05:51

## 2023-08-09 RX ADMIN — Medication 10 UNIT(S): at 16:54

## 2023-08-09 RX ADMIN — SODIUM CHLORIDE 100 MILLILITER(S): 9 INJECTION INTRAMUSCULAR; INTRAVENOUS; SUBCUTANEOUS at 06:45

## 2023-08-09 RX ADMIN — MEROPENEM 100 MILLIGRAM(S): 1 INJECTION INTRAVENOUS at 21:09

## 2023-08-09 RX ADMIN — Medication 650 MILLIGRAM(S): at 17:10

## 2023-08-09 RX ADMIN — PANTOPRAZOLE SODIUM 40 MILLIGRAM(S): 20 TABLET, DELAYED RELEASE ORAL at 07:50

## 2023-08-09 RX ADMIN — Medication 4: at 16:40

## 2023-08-09 RX ADMIN — INSULIN GLARGINE 26 UNIT(S): 100 INJECTION, SOLUTION SUBCUTANEOUS at 01:45

## 2023-08-09 RX ADMIN — Medication 5 MILLIGRAM(S): at 05:51

## 2023-08-09 RX ADMIN — Medication 8 UNIT(S): at 12:04

## 2023-08-09 RX ADMIN — MEROPENEM 100 MILLIGRAM(S): 1 INJECTION INTRAVENOUS at 14:49

## 2023-08-09 NOTE — CHART NOTE - NSCHARTNOTEFT_GEN_A_CORE
HPI:  A 63 Y/M with Pmhx of HTN, HLD, DM, papillary urothelial bladder cancer s/p tranurethral resection(6/2022) and BCG treatment(last cycle on 7/27/2023) presented to ED with the complains of burning urination with increased frequency. The patient went to visit his PCP(8 days back) with these complains and was found to have UTI and was started on Ciprofloxacin. Today the patient had fever of 101 F and went to follow up with his PCP and was found to have increased serum glucose level, therefore was sent to ED. No abdominal pain, vomiting, headache, SOB, chest pain, dizziness or LOC.     #ED vitals:  T(C): 36.8 (08-08-23 @ 15:41), Max: 37 (08-08-23 @ 11:38)  HR: 87 (08-08-23 @ 15:41) (82 - 87)  BP: 161/73 (08-08-23 @ 15:41) (132/74 - 161/73)  RR: 18 (08-08-23 @ 15:41) (18 - 18)  SpO2: 97% (08-08-23 @ 15:41) (97% - 99%)    #Labs significant for:  Glucose 449, Na 130, Cr 1.4(BL 1.1), AST 44, ALT 46, Anion Gap 19, Beta hydroxybutyrate 0.6, VBG: pH 7.29, Lactate 4.4, pCo2 51, UA positive    #S/P: Cefepime 2gm IV x 1, LR bolus 2L in the ED.        CCU course  :   Admitted on 8/8/2023 At night as a case of UTI + DKA.  Patient was in mild DKA with B-hydroxyb of 0.6 and oh of 7.29 , hco3 of 20 and glucose of 500's , AG was 19 but lactate was 4.4   Patient was started on insulin drip which was stopped at 1:30 am of 8-9-2023.   Patient was switched to SQ insulin and labs showed closure of AG and patient resumed feeding.   To be continued on IV rocephin for UTI and F/U cultures , ID on board.         Impression    Mild DKA  Acute lactic acidosis  - likely secondary to metformin in the setting of renal impairment  Prerenal azotemia   Urinary tract infection  Hypertension  Pseudohyponatremia    Plan:      CNS:  No acute concerns, at baseline    HEENT:  Oral care    CARDIOVASCULAR  home antihypertensives    PULMONARY  HOB @ 45 degrees, aspiration precautions  Tolerating RA appropriately, no acute concerns    GASTROINTESTINAL  GI prophylaxis: not indicated  Feeds: diabetic diet  BM: regimen PRN    GENITOURINARY/RENAL  Prerenal azotemia now resolved  Continue to monitor lytes/creatinine  Continue LR resuscitation  Pseudohyponatremia secondary to hyperglycemia-  resolved    INFECTIOUS  Complete 5-7 day course of CTX  Follow-up BCx, UCx  Appreciate ID recommendations    HEMATOLOGIC  DVT ppx: lovenox SQ    ENDOCRINE  Completed insulin gtt protocol for DKA  Start basal bolus, consult endocrine  Follow up FS.  Insulin protocol as needed.  BG goal 140-180    MSK/DERM  OOBAT      CODE STATUS: FULL  LINES: none  DISPO: GMF . HPI:  A 63 Y/M with Pmhx of HTN, HLD, DM, papillary urothelial bladder cancer s/p tranurethral resection(6/2022) and BCG treatment(last cycle on 7/27/2023) presented to ED with the complains of burning urination with increased frequency. The patient went to visit his PCP(8 days back) with these complains and was found to have UTI and was started on Ciprofloxacin. Today the patient had fever of 101 F and went to follow up with his PCP and was found to have increased serum glucose level, therefore was sent to ED. No abdominal pain, vomiting, headache, SOB, chest pain, dizziness or LOC.     #ED vitals:  T(C): 36.8 (08-08-23 @ 15:41), Max: 37 (08-08-23 @ 11:38)  HR: 87 (08-08-23 @ 15:41) (82 - 87)  BP: 161/73 (08-08-23 @ 15:41) (132/74 - 161/73)  RR: 18 (08-08-23 @ 15:41) (18 - 18)  SpO2: 97% (08-08-23 @ 15:41) (97% - 99%)    #Labs significant for:  Glucose 449, Na 130, Cr 1.4(BL 1.1), AST 44, ALT 46, Anion Gap 19, Beta hydroxybutyrate 0.6, VBG: pH 7.29, Lactate 4.4, pCo2 51, UA positive    #S/P: Cefepime 2gm IV x 1, LR bolus 2L in the ED.        CCU course  :   Admitted on 8/8/2023 At night as a case of UTI + DKA.  Patient was in mild DKA with B-hydroxyb of 0.6 and oh of 7.29 , hco3 of 20 and glucose of 500's , AG was 19 but lactate was 4.4   Patient was started on insulin drip which was stopped at 1:30 am of 8-9-2023.   Patient was switched to SQ insulin and labs showed closure of AG and patient resumed feeding.   To be continued on IV meropenem for UTI and F/U cultures , ID on board.         Impression    Mild DKA  Acute lactic acidosis  - likely secondary to metformin in the setting of renal impairment  Prerenal azotemia   Urinary tract infection  Hypertension  Pseudohyponatremia    Plan:      CNS:  No acute concerns, at baseline    HEENT:  Oral care    CARDIOVASCULAR  home antihypertensives    PULMONARY  HOB @ 45 degrees, aspiration precautions  Tolerating RA appropriately, no acute concerns    GASTROINTESTINAL  GI prophylaxis: not indicated  Feeds: diabetic diet  BM: regimen PRN    GENITOURINARY/RENAL  Prerenal azotemia now resolved  Continue to monitor lytes/creatinine  Continue LR resuscitation  Pseudohyponatremia secondary to hyperglycemia-  resolved    INFECTIOUS  Complete 5-7 day course of meropenem  Follow-up BCx, UCx  Appreciate ID recommendations    HEMATOLOGIC  DVT ppx: lovenox SQ    ENDOCRINE  Completed insulin gtt protocol for DKA  Start basal bolus, consult endocrine  Follow up FS.  Insulin protocol as needed.  BG goal 140-180    MSK/DERM  OOBAT      CODE STATUS: FULL  LINES: none  DISPO: GMF .

## 2023-08-09 NOTE — PROGRESS NOTE ADULT - ASSESSMENT
63 Y/M with Pmhx of HTN, HLD, DM, papillary urothelial bladder cancer s/p tranurethral resection(6/2022) and BCG treatment(last cycle on 7/27/2023) presented to ED with the complains of burning urination with increased frequency. The patient went to visit his PCP(8 days back) with these complains and was found to have UTI and was started on Ciprofloxacin. Today the patient had fever of 101 F and went to follow up with his PCP and was found to have increased serum glucose level, therefore was sent to ED.    IMPRESSION/RECOMMENDATIONS      63 Y/M with Pmhx of HTN, HLD, DM, papillary urothelial bladder cancer s/p tranurethral resection(6/2022) and BCG treatment(last cycle on 7/27/2023) presented to ED with the complains of burning urination with increased frequency. The patient went to visit his PCP(8 days back) with these complains and was found to have UTI and was started on Ciprofloxacin. Today the patient had fever of 101 F and went to follow up with his PCP and was found to have increased serum glucose level, therefore was sent to ED.    IMPRESSION/RECOMMENDATIONS   Acute pyelonephritis with no obstructive uropathy  No patino  WBC 8.1  Was on po Cipro for 8 days PTA with no relief of symptoms  -UCx  -BCx  -Meropenem 1 gm iv q8h ( recent Cipro usage a risk factor for MDR GNR )  -Off loading to prevent pressure sores and preventive measures to avoid aspiration

## 2023-08-09 NOTE — PROGRESS NOTE ADULT - SUBJECTIVE AND OBJECTIVE BOX
KIKA SAMANIEGO  63y, Male  Allergy: No Known Allergies      All historical available data reviewed.    HPI:  A 63 Y/M with Pmhx of HTN, HLD, DM, papillary urothelial bladder cancer s/p tranurethral resection(6/2022) and BCG treatment(last cycle on 7/27/2023) presented to ED with the complains of burning urination with increased frequency. The patient went to visit his PCP(8 days back) with these complains and was found to have UTI and was started on Ciprofloxacin. Today the patient had fever of 101 F and went to follow up with his PCP and was found to have increased serum glucose level, therefore was sent to ED. No abdominal pain, vomiting, headache, SOB, chest pain, dizziness or LOC.     #ED vitals:  T(C): 36.8 (08-08-23 @ 15:41), Max: 37 (08-08-23 @ 11:38)  HR: 87 (08-08-23 @ 15:41) (82 - 87)  BP: 161/73 (08-08-23 @ 15:41) (132/74 - 161/73)  RR: 18 (08-08-23 @ 15:41) (18 - 18)  SpO2: 97% (08-08-23 @ 15:41) (97% - 99%)    #Labs significant for:  Glucose 449, Na 130, Cr 1.4(BL 1.1), AST 44, ALT 46, Anion Gap 19, Beta hydroxybutyrate 0.6, VBG: pH 7.29, Lactate 4.4, pCo2 51, UA positive    #S/P: Cefepime 2gm IV x 1, LR bolus 2L in the ED.    The patient is being admitted for the further management of UTI and DKA.     (08 Aug 2023 19:38)    FAMILY HISTORY:    PAST MEDICAL & SURGICAL HISTORY:  DM (diabetes mellitus)      HTN (hypertension)      Hypercholesterolemia  NO MEDS      Bladder cancer      History of cholecystectomy      History of appendectomy      History of tonsillectomy      H/O cataract extraction  b/l eyes with b/l iol's            VITALS:  T(F): 98.7, Max: 98.7 (08-09-23 @ 00:00)  HR: 75  BP: 170/72  RR: 38Vital Signs Last 24 Hrs  T(C): 37.1 (09 Aug 2023 00:00), Max: 37.1 (09 Aug 2023 00:00)  T(F): 98.7 (09 Aug 2023 00:00), Max: 98.7 (09 Aug 2023 00:00)  HR: 75 (09 Aug 2023 03:00) (74 - 90)  BP: 170/72 (09 Aug 2023 03:00) (132/74 - 179/71)  BP(mean): 104 (09 Aug 2023 03:00) (91 - 108)  RR: 38 (09 Aug 2023 03:00) (18 - 38)  SpO2: 94% (09 Aug 2023 03:00) (94% - 99%)    Parameters below as of 09 Aug 2023 00:00  Patient On (Oxygen Delivery Method): room air        TESTS & MEASUREMENTS:                        11.9   8.93  )-----------( 363      ( 08 Aug 2023 13:23 )             36.7     08-08    130<L>  |  91<L>  |  27<H>  ----------------------------<  498<HH>  5.0   |  20  |  1.4    Ca    9.1      08 Aug 2023 13:23  Phos  3.5     08-08  Mg     2.2     08-08    TPro  7.4  /  Alb  3.6  /  TBili  0.3  /  DBili  x   /  AST  44<H>  /  ALT  46<H>  /  AlkPhos  85  08-08    LIVER FUNCTIONS - ( 08 Aug 2023 13:23 )  Alb: 3.6 g/dL / Pro: 7.4 g/dL / ALK PHOS: 85 U/L / ALT: 46 U/L / AST: 44 U/L / GGT: x             Urinalysis Basic - ( 08 Aug 2023 13:23 )    Color: Dark Yellow / Appearance: Clear / SG: >1.030 / pH: x  Gluc: 498 mg/dL / Ketone: Trace mg/dL  / Bili: Negative / Urobili: 1.0 mg/dL   Blood: x / Protein: 30 mg/dL / Nitrite: Positive   Leuk Esterase: Negative / RBC: 5 /HPF / WBC 10 /HPF   Sq Epi: x / Non Sq Epi: 2 /HPF / Bacteria: Moderate /HPF          RADIOLOGY & ADDITIONAL TESTS:  Personal review of radiological diagnostics performed  Echo and EKG results noted when applicable.     MEDICATIONS:  acetaminophen     Tablet .. 650 milliGRAM(s) Oral every 6 hours PRN  aluminum hydroxide/magnesium hydroxide/simethicone Suspension 30 milliLiter(s) Oral every 4 hours PRN  cefTRIAXone   IVPB 1000 milliGRAM(s) IV Intermittent every 24 hours  chlorhexidine 2% Cloths 1 Application(s) Topical <User Schedule>  dextrose 5%. 1000 milliLiter(s) IV Continuous <Continuous>  dextrose 5%. 1000 milliLiter(s) IV Continuous <Continuous>  dextrose 50% Injectable 25 Gram(s) IV Push once  dextrose 50% Injectable 12.5 Gram(s) IV Push once  dextrose 50% Injectable 25 Gram(s) IV Push once  dextrose Oral Gel 15 Gram(s) Oral once PRN  enalapril 5 milliGRAM(s) Oral daily  glucagon  Injectable 1 milliGRAM(s) IntraMuscular once  heparin   Injectable 5000 Unit(s) SubCutaneous every 12 hours  insulin glargine Injectable (LANTUS) 26 Unit(s) SubCutaneous at bedtime  insulin lispro (ADMELOG) corrective regimen sliding scale   SubCutaneous three times a day before meals  insulin lispro Injectable (ADMELOG) 8 Unit(s) SubCutaneous three times a day before meals  melatonin 3 milliGRAM(s) Oral at bedtime PRN  pantoprazole    Tablet 40 milliGRAM(s) Oral before breakfast  sodium chloride 0.9%. 1000 milliLiter(s) IV Continuous <Continuous>      ANTIBIOTICS:  cefTRIAXone   IVPB 1000 milliGRAM(s) IV Intermittent every 24 hours

## 2023-08-10 LAB
ALBUMIN SERPL ELPH-MCNC: 3.1 G/DL — LOW (ref 3.5–5.2)
ALP SERPL-CCNC: 69 U/L — SIGNIFICANT CHANGE UP (ref 30–115)
ALT FLD-CCNC: 48 U/L — HIGH (ref 0–41)
ANION GAP SERPL CALC-SCNC: 11 MMOL/L — SIGNIFICANT CHANGE UP (ref 7–14)
AST SERPL-CCNC: 34 U/L — SIGNIFICANT CHANGE UP (ref 0–41)
BASOPHILS # BLD AUTO: 0.04 K/UL — SIGNIFICANT CHANGE UP (ref 0–0.2)
BASOPHILS NFR BLD AUTO: 0.5 % — SIGNIFICANT CHANGE UP (ref 0–1)
BILIRUB SERPL-MCNC: 0.3 MG/DL — SIGNIFICANT CHANGE UP (ref 0.2–1.2)
BUN SERPL-MCNC: 15 MG/DL — SIGNIFICANT CHANGE UP (ref 10–20)
CALCIUM SERPL-MCNC: 8.8 MG/DL — SIGNIFICANT CHANGE UP (ref 8.4–10.5)
CHLORIDE SERPL-SCNC: 100 MMOL/L — SIGNIFICANT CHANGE UP (ref 98–110)
CO2 SERPL-SCNC: 26 MMOL/L — SIGNIFICANT CHANGE UP (ref 17–32)
CREAT SERPL-MCNC: 0.9 MG/DL — SIGNIFICANT CHANGE UP (ref 0.7–1.5)
EGFR: 95 ML/MIN/1.73M2 — SIGNIFICANT CHANGE UP
EOSINOPHIL # BLD AUTO: 0.13 K/UL — SIGNIFICANT CHANGE UP (ref 0–0.7)
EOSINOPHIL NFR BLD AUTO: 1.7 % — SIGNIFICANT CHANGE UP (ref 0–8)
GLUCOSE BLDC GLUCOMTR-MCNC: 164 MG/DL — HIGH (ref 70–99)
GLUCOSE BLDC GLUCOMTR-MCNC: 195 MG/DL — HIGH (ref 70–99)
GLUCOSE BLDC GLUCOMTR-MCNC: 251 MG/DL — HIGH (ref 70–99)
GLUCOSE BLDC GLUCOMTR-MCNC: 297 MG/DL — HIGH (ref 70–99)
GLUCOSE SERPL-MCNC: 253 MG/DL — HIGH (ref 70–99)
HCT VFR BLD CALC: 31.3 % — LOW (ref 42–52)
HGB BLD-MCNC: 10.4 G/DL — LOW (ref 14–18)
IMM GRANULOCYTES NFR BLD AUTO: 1.6 % — HIGH (ref 0.1–0.3)
LYMPHOCYTES # BLD AUTO: 1.49 K/UL — SIGNIFICANT CHANGE UP (ref 1.2–3.4)
LYMPHOCYTES # BLD AUTO: 19.8 % — LOW (ref 20.5–51.1)
MAGNESIUM SERPL-MCNC: 1.8 MG/DL — SIGNIFICANT CHANGE UP (ref 1.8–2.4)
MCHC RBC-ENTMCNC: 28 PG — SIGNIFICANT CHANGE UP (ref 27–31)
MCHC RBC-ENTMCNC: 33.2 G/DL — SIGNIFICANT CHANGE UP (ref 32–37)
MCV RBC AUTO: 84.1 FL — SIGNIFICANT CHANGE UP (ref 80–94)
MONOCYTES # BLD AUTO: 0.65 K/UL — HIGH (ref 0.1–0.6)
MONOCYTES NFR BLD AUTO: 8.7 % — SIGNIFICANT CHANGE UP (ref 1.7–9.3)
NEUTROPHILS # BLD AUTO: 5.08 K/UL — SIGNIFICANT CHANGE UP (ref 1.4–6.5)
NEUTROPHILS NFR BLD AUTO: 67.7 % — SIGNIFICANT CHANGE UP (ref 42.2–75.2)
NRBC # BLD: 0 /100 WBCS — SIGNIFICANT CHANGE UP (ref 0–0)
PLATELET # BLD AUTO: 409 K/UL — HIGH (ref 130–400)
PMV BLD: 10.4 FL — SIGNIFICANT CHANGE UP (ref 7.4–10.4)
POTASSIUM SERPL-MCNC: 4.8 MMOL/L — SIGNIFICANT CHANGE UP (ref 3.5–5)
POTASSIUM SERPL-SCNC: 4.8 MMOL/L — SIGNIFICANT CHANGE UP (ref 3.5–5)
PROT SERPL-MCNC: 6 G/DL — SIGNIFICANT CHANGE UP (ref 6–8)
RBC # BLD: 3.72 M/UL — LOW (ref 4.7–6.1)
RBC # FLD: 13.4 % — SIGNIFICANT CHANGE UP (ref 11.5–14.5)
SODIUM SERPL-SCNC: 137 MMOL/L — SIGNIFICANT CHANGE UP (ref 135–146)
WBC # BLD: 7.51 K/UL — SIGNIFICANT CHANGE UP (ref 4.8–10.8)
WBC # FLD AUTO: 7.51 K/UL — SIGNIFICANT CHANGE UP (ref 4.8–10.8)

## 2023-08-10 PROCEDURE — 99232 SBSQ HOSP IP/OBS MODERATE 35: CPT

## 2023-08-10 RX ORDER — INSULIN LISPRO 100/ML
12 VIAL (ML) SUBCUTANEOUS
Refills: 0 | Status: DISCONTINUED | OUTPATIENT
Start: 2023-08-10 | End: 2023-08-11

## 2023-08-10 RX ORDER — INSULIN GLARGINE 100 [IU]/ML
32 INJECTION, SOLUTION SUBCUTANEOUS AT BEDTIME
Refills: 0 | Status: DISCONTINUED | OUTPATIENT
Start: 2023-08-10 | End: 2023-08-10

## 2023-08-10 RX ORDER — INSULIN GLARGINE 100 [IU]/ML
36 INJECTION, SOLUTION SUBCUTANEOUS AT BEDTIME
Refills: 0 | Status: DISCONTINUED | OUTPATIENT
Start: 2023-08-10 | End: 2023-08-11

## 2023-08-10 RX ADMIN — Medication 3: at 08:44

## 2023-08-10 RX ADMIN — Medication 10 UNIT(S): at 08:44

## 2023-08-10 RX ADMIN — Medication 1: at 17:59

## 2023-08-10 RX ADMIN — MEROPENEM 100 MILLIGRAM(S): 1 INJECTION INTRAVENOUS at 13:19

## 2023-08-10 RX ADMIN — Medication 12 UNIT(S): at 17:59

## 2023-08-10 RX ADMIN — Medication 3: at 12:16

## 2023-08-10 RX ADMIN — INSULIN GLARGINE 36 UNIT(S): 100 INJECTION, SOLUTION SUBCUTANEOUS at 21:01

## 2023-08-10 RX ADMIN — Medication 5 MILLIGRAM(S): at 05:54

## 2023-08-10 RX ADMIN — Medication 10 UNIT(S): at 12:16

## 2023-08-10 RX ADMIN — ENOXAPARIN SODIUM 40 MILLIGRAM(S): 100 INJECTION SUBCUTANEOUS at 11:18

## 2023-08-10 RX ADMIN — MEROPENEM 100 MILLIGRAM(S): 1 INJECTION INTRAVENOUS at 21:01

## 2023-08-10 RX ADMIN — MEROPENEM 100 MILLIGRAM(S): 1 INJECTION INTRAVENOUS at 05:55

## 2023-08-10 NOTE — PROGRESS NOTE ADULT - SUBJECTIVE AND OBJECTIVE BOX
S: Dysuria better       All other pertinent ROS negative.      08-09-23 @ 07:01  -  08-10-23 @ 07:00  --------------------------------------------------------  IN: 1450 mL / OUT: 0 mL / NET: 1450 mL      Vital Signs Last 24 Hrs  T(C): 36.7 (10 Aug 2023 12:51), Max: 37.1 (09 Aug 2023 20:15)  T(F): 98.1 (10 Aug 2023 12:51), Max: 98.8 (09 Aug 2023 20:15)  HR: 16 (10 Aug 2023 12:51) (16 - 81)  BP: 139/81 (10 Aug 2023 12:51) (139/81 - 188/86)  BP(mean): 123 (09 Aug 2023 22:31) (107 - 123)  RR: 16 (10 Aug 2023 12:51) (16 - 26)  SpO2: 97% (10 Aug 2023 12:51) (97% - 98%)    Parameters below as of 09 Aug 2023 20:15  Patient On (Oxygen Delivery Method): room air      PHYSICAL EXAM:    Constitutional: NAD, awake and alert  HEENT: PERR, EOMI, Normal Hearing, MMM  Neck: Soft and supple, No LAD, No JVD  Respiratory: Breath sounds are clear bilaterally, No wheezing, rales or rhonchi  Cardiovascular: S1 and S2, regular rate and rhythm, no Murmurs, gallops or rubs  Gastrointestinal: Bowel Sounds present, soft, nontender, nondistended, no guarding, no rebound  Extremities: No peripheral edema      MEDICATIONS:  MEDICATIONS  (STANDING):  chlorhexidine 2% Cloths 1 Application(s) Topical <User Schedule>  dextrose 5%. 1000 milliLiter(s) (100 mL/Hr) IV Continuous <Continuous>  dextrose 5%. 1000 milliLiter(s) (50 mL/Hr) IV Continuous <Continuous>  dextrose 50% Injectable 25 Gram(s) IV Push once  dextrose 50% Injectable 12.5 Gram(s) IV Push once  dextrose 50% Injectable 25 Gram(s) IV Push once  enalapril 5 milliGRAM(s) Oral daily  enoxaparin Injectable 40 milliGRAM(s) SubCutaneous every 24 hours  glucagon  Injectable 1 milliGRAM(s) IntraMuscular once  insulin glargine Injectable (LANTUS) 36 Unit(s) SubCutaneous at bedtime  insulin lispro (ADMELOG) corrective regimen sliding scale   SubCutaneous three times a day before meals  insulin lispro Injectable (ADMELOG) 12 Unit(s) SubCutaneous three times a day before meals  meropenem  IVPB      meropenem  IVPB 1000 milliGRAM(s) IV Intermittent every 8 hours      LABS: All Labs Reviewed:                        10.4   7.51  )-----------( 409      ( 10 Aug 2023 06:50 )             31.3     08-10    137  |  100  |  15  ----------------------------<  253<H>  4.8   |  26  |  0.9    Ca    8.8      10 Aug 2023 06:50  Mg     1.8     08-10    TPro  6.0  /  Alb  3.1<L>  /  TBili  0.3  /  DBili  x   /  AST  34  /  ALT  48<H>  /  AlkPhos  69  08-10          Blood Culture: 08-08 @ 17:44  Organism --  Gram Stain Blood -- Gram Stain --  Specimen Source .Blood Blood  Culture-Blood --    08-08 @ 15:06  Organism --  Gram Stain Blood -- Gram Stain --  Specimen Source .Blood Blood  Culture-Blood --    08-08 @ 13:23  Organism --  Gram Stain Blood -- Gram Stain --  Specimen Source Clean Catch Clean Catch (Midstream)  Culture-Blood --        Radiology: reviewed

## 2023-08-10 NOTE — PROGRESS NOTE ADULT - GENITOURINARY MALE
normal external genitalia/no hernia/no discharge/no mass/no tenderness/no ulcer/normal/no penile lesion/no palpable testicular mass/no scrotal mass
normal external genitalia/no hernia/no discharge/no mass/no tenderness/no ulcer/normal/no penile lesion/no palpable testicular mass/no scrotal mass

## 2023-08-10 NOTE — CONSULT NOTE ADULT - SUBJECTIVE AND OBJECTIVE BOX
Request for consultation:  Requested by:    Patient is a 63y old  Male who presents with a chief complaint of Burning urination (10 Aug 2023 10:19)    HPI:  A 63 Y/M with Pmhx of HTN, HLD, DM, papillary urothelial bladder cancer s/p tranurethral resection(6/2022) and BCG treatment(last cycle on 7/27/2023) presented to ED with the complains of burning urination with increased frequency. The patient went to visit his PCP(8 days back) with these complains and was found to have UTI and was started on Ciprofloxacin. Today the patient had fever of 101 F and went to follow up with his PCP and was found to have increased serum glucose level, therefore was sent to ED. No abdominal pain, vomiting, headache, SOB, chest pain, dizziness or LOC.     #ED vitals:  T(C): 36.8 (08-08-23 @ 15:41), Max: 37 (08-08-23 @ 11:38)  HR: 87 (08-08-23 @ 15:41) (82 - 87)  BP: 161/73 (08-08-23 @ 15:41) (132/74 - 161/73)  RR: 18 (08-08-23 @ 15:41) (18 - 18)  SpO2: 97% (08-08-23 @ 15:41) (97% - 99%)    #Labs significant for:  Glucose 449, Na 130, Cr 1.4(BL 1.1), AST 44, ALT 46, Anion Gap 19, Beta hydroxybutyrate 0.6, VBG: pH 7.29, Lactate 4.4, pCo2 51, UA positive    #S/P: Cefepime 2gm IV x 1, LR bolus 2L in the ED.    The patient is being admitted for the further management of UTI and DKA.     (08 Aug 2023 19:38)    Pt admitted to the hospital for DKA and UTI. Now s/p insulin drip with AG 19 -> 11, transitioned to subq insulin Lantus 28, Lispro 8/8/8 with ISS 1. Increased to Lantus 32, lispro 10/10/10. Pt remains slightly hyperglycemic on current regimen requiring up to 6 extra units of insulin through sliding scale.     FAMILY HISTORY:    PAST MEDICAL & SURGICAL HISTORY:  DM (diabetes mellitus)      HTN (hypertension)      Hypercholesterolemia  NO MEDS      Bladder cancer      History of cholecystectomy      History of appendectomy      History of tonsillectomy      H/O cataract extraction  b/l eyes with b/l iol's        Birth History:  Developmental History:    Review of Systems:  All review of systems negative, except for those marked:  General:		[] Abnormal:  Pulmonary:		[] Abnormal:  Cardiac:		[] Abnormal:  Gastrointestinal:	[] Abnormal:  ENT:			[] Abnormal:  Renal/Urologic:		[] Abnormal:  Musculoskeletal:	[] Abnormal:  Endocrine:		[] Abnormal:  Hematologic:		[] Abnormal:  Neurologic:		[] Abnormal:  Skin:			[] Abnormal:  Allergy/Immune:	[] Abnormal:  Psychiatric:		[] Abnormal:    Allergies    No Known Allergies    Intolerances      MEDICATIONS  (STANDING):  chlorhexidine 2% Cloths 1 Application(s) Topical <User Schedule>  dextrose 5%. 1000 milliLiter(s) (50 mL/Hr) IV Continuous <Continuous>  dextrose 5%. 1000 milliLiter(s) (100 mL/Hr) IV Continuous <Continuous>  dextrose 50% Injectable 25 Gram(s) IV Push once  dextrose 50% Injectable 12.5 Gram(s) IV Push once  dextrose 50% Injectable 25 Gram(s) IV Push once  enalapril 5 milliGRAM(s) Oral daily  enoxaparin Injectable 40 milliGRAM(s) SubCutaneous every 24 hours  glucagon  Injectable 1 milliGRAM(s) IntraMuscular once  insulin glargine Injectable (LANTUS) 32 Unit(s) SubCutaneous at bedtime  insulin lispro (ADMELOG) corrective regimen sliding scale   SubCutaneous three times a day before meals  insulin lispro Injectable (ADMELOG) 10 Unit(s) SubCutaneous three times a day before meals  meropenem  IVPB      meropenem  IVPB 1000 milliGRAM(s) IV Intermittent every 8 hours    MEDICATIONS  (PRN):  acetaminophen     Tablet .. 650 milliGRAM(s) Oral every 6 hours PRN Temp greater or equal to 38C (100.4F), Mild Pain (1 - 3)  aluminum hydroxide/magnesium hydroxide/simethicone Suspension 30 milliLiter(s) Oral every 4 hours PRN Dyspepsia  dextrose Oral Gel 15 Gram(s) Oral once PRN Blood Glucose LESS THAN 70 milliGRAM(s)/deciliter  melatonin 3 milliGRAM(s) Oral at bedtime PRN Insomnia      Vital Signs Last 24 Hrs  T(C): 36.7 (10 Aug 2023 12:51), Max: 37.7 (09 Aug 2023 16:20)  T(F): 98.1 (10 Aug 2023 12:51), Max: 99.9 (09 Aug 2023 16:20)  HR: 16 (10 Aug 2023 12:51) (16 - 81)  BP: 139/81 (10 Aug 2023 12:51) (139/81 - 188/86)  BP(mean): 123 (09 Aug 2023 22:31) (105 - 123)  RR: 16 (10 Aug 2023 12:51) (16 - 27)  SpO2: 97% (10 Aug 2023 12:51) (96% - 98%)    Parameters below as of 09 Aug 2023 20:15  Patient On (Oxygen Delivery Method): room air      Height (cm): 180.3 (08-08 @ 21:04)  Weight (kg): 106.9 (08-08 @ 21:04)  BMI (kg/m2): 32.9 (08-08 @ 21:04)    PHYSICAL EXAM  All physical exam findings normal, except those marked:  General:	Alert, active, cooperative, NAD, well hydrated  Neck		Normal: supple, no cervical adenopathy, no palpable thyroid  Cardiovascular	Normal: regular rate, normal S1, S2, no murmurs  Respiratory	Normal: no chest wall deformity, normal respiratory pattern, CTA B/L  Abdominal	Normal: soft, ND, NT, bowel sounds present, no masses, no organomegaly  		Normal normal genitalia, testes descended, circumcised/uncircumcised  .		Cristian stage:			Breast cristian:  .		Menstrual history:  Extremities	Normal: FROM x4  Skin		Normal: intact and not indurated, no rash, no acanthosis nigricans  Neurologic	Normal: grossly intact    LABS  VBG - ( 08 Aug 2023 13:18 )  pH: 7.29  /  pCO2: 51    /  pO2: 22    / HCO3: 24    / Base Excess: -2.5  /  SvO2: 27.4  / Lactate: 4.40                           10.4   7.51  )-----------( 409      ( 10 Aug 2023 06:50 )             31.3     08-10    137  |  100  |  15  ----------------------------<  253<H>  4.8   |  26  |  0.9    Ca    8.8      10 Aug 2023 06:50  Phos  3.5     08-08  Mg     1.8     08-10    TPro  6.0  /  Alb  3.1<L>  /  TBili  0.3  /  DBili  x   /  AST  34  /  ALT  48<H>  /  AlkPhos  69  08-10      Ketone - Urine: Trace mg/dL (08-08 @ 13:23)    CAPILLARY BLOOD GLUCOSE      POCT Blood Glucose.: 297 mg/dL (10 Aug 2023 12:01)  POCT Blood Glucose.: 251 mg/dL (10 Aug 2023 08:07)  POCT Blood Glucose.: 180 mg/dL (09 Aug 2023 22:24)  POCT Blood Glucose.: 155 mg/dL (09 Aug 2023 20:50)  POCT Blood Glucose.: 314 mg/dL (09 Aug 2023 16:24)   Request for consultation:  Requested by:    Patient is a 63y old  Male who presents with a chief complaint of Burning urination (10 Aug 2023 10:19)    HPI:  A 63 Y/M with Pmhx of HTN, HLD, DM, papillary urothelial bladder cancer s/p tranurethral resection(6/2022) and BCG treatment(last cycle on 7/27/2023) presented to ED with the complains of burning urination with increased frequency. The patient went to visit his PCP(8 days back) with these complains and was found to have UTI and was started on Ciprofloxacin. Today the patient had fever of 101 F and went to follow up with his PCP and was found to have increased serum glucose level, therefore was sent to ED. No abdominal pain, vomiting, headache, SOB, chest pain, dizziness or LOC.     #ED vitals:  T(C): 36.8 (08-08-23 @ 15:41), Max: 37 (08-08-23 @ 11:38)  HR: 87 (08-08-23 @ 15:41) (82 - 87)  BP: 161/73 (08-08-23 @ 15:41) (132/74 - 161/73)  RR: 18 (08-08-23 @ 15:41) (18 - 18)  SpO2: 97% (08-08-23 @ 15:41) (97% - 99%)    #Labs significant for:  Glucose 449, Na 130, Cr 1.4(BL 1.1), AST 44, ALT 46, Anion Gap 19, Beta hydroxybutyrate 0.6, VBG: pH 7.29, Lactate 4.4, pCo2 51, UA positive    #S/P: Cefepime 2gm IV x 1, LR bolus 2L in the ED.    The patient is being admitted for the further management of UTI and DKA.     (08 Aug 2023 19:38)    Pt admitted to the hospital for DKA and UTI. Now s/p insulin drip with AG 19 -> 11, transitioned to subq insulin Lantus 28, Lispro 8/8/8 with ISS 1. Increased to Lantus 32, lispro 10/10/10. Pt remains slightly hyperglycemic on current regimen requiring up to 6 extra units of insulin through sliding scale. Pt was taken off januvia and DDP4 inhibitor 2 days PTA and started on lantus 20u daily 2 days PTA. A1c 9.6    FAMILY HISTORY:    PAST MEDICAL & SURGICAL HISTORY:  DM (diabetes mellitus)      HTN (hypertension)      Hypercholesterolemia  NO MEDS      Bladder cancer      History of cholecystectomy      History of appendectomy      History of tonsillectomy      H/O cataract extraction  b/l eyes with b/l iol's        Birth History:  Developmental History:    Review of Systems:  All review of systems negative, except for those marked:  General:		[] Abnormal:  Pulmonary:		[] Abnormal:  Cardiac:		[] Abnormal:  Gastrointestinal:	[] Abnormal:  ENT:			[] Abnormal:  Renal/Urologic:		[] Abnormal:  Musculoskeletal:	[] Abnormal:  Endocrine:		[] Abnormal:  Hematologic:		[] Abnormal:  Neurologic:		[] Abnormal:  Skin:			[] Abnormal:  Allergy/Immune:	[] Abnormal:  Psychiatric:		[] Abnormal:    Allergies    No Known Allergies    Intolerances      MEDICATIONS  (STANDING):  chlorhexidine 2% Cloths 1 Application(s) Topical <User Schedule>  dextrose 5%. 1000 milliLiter(s) (50 mL/Hr) IV Continuous <Continuous>  dextrose 5%. 1000 milliLiter(s) (100 mL/Hr) IV Continuous <Continuous>  dextrose 50% Injectable 25 Gram(s) IV Push once  dextrose 50% Injectable 12.5 Gram(s) IV Push once  dextrose 50% Injectable 25 Gram(s) IV Push once  enalapril 5 milliGRAM(s) Oral daily  enoxaparin Injectable 40 milliGRAM(s) SubCutaneous every 24 hours  glucagon  Injectable 1 milliGRAM(s) IntraMuscular once  insulin glargine Injectable (LANTUS) 32 Unit(s) SubCutaneous at bedtime  insulin lispro (ADMELOG) corrective regimen sliding scale   SubCutaneous three times a day before meals  insulin lispro Injectable (ADMELOG) 10 Unit(s) SubCutaneous three times a day before meals  meropenem  IVPB      meropenem  IVPB 1000 milliGRAM(s) IV Intermittent every 8 hours    MEDICATIONS  (PRN):  acetaminophen     Tablet .. 650 milliGRAM(s) Oral every 6 hours PRN Temp greater or equal to 38C (100.4F), Mild Pain (1 - 3)  aluminum hydroxide/magnesium hydroxide/simethicone Suspension 30 milliLiter(s) Oral every 4 hours PRN Dyspepsia  dextrose Oral Gel 15 Gram(s) Oral once PRN Blood Glucose LESS THAN 70 milliGRAM(s)/deciliter  melatonin 3 milliGRAM(s) Oral at bedtime PRN Insomnia      Vital Signs Last 24 Hrs  T(C): 36.7 (10 Aug 2023 12:51), Max: 37.7 (09 Aug 2023 16:20)  T(F): 98.1 (10 Aug 2023 12:51), Max: 99.9 (09 Aug 2023 16:20)  HR: 16 (10 Aug 2023 12:51) (16 - 81)  BP: 139/81 (10 Aug 2023 12:51) (139/81 - 188/86)  BP(mean): 123 (09 Aug 2023 22:31) (105 - 123)  RR: 16 (10 Aug 2023 12:51) (16 - 27)  SpO2: 97% (10 Aug 2023 12:51) (96% - 98%)    Parameters below as of 09 Aug 2023 20:15  Patient On (Oxygen Delivery Method): room air      Height (cm): 180.3 (08-08 @ 21:04)  Weight (kg): 106.9 (08-08 @ 21:04)  BMI (kg/m2): 32.9 (08-08 @ 21:04)    PHYSICAL EXAM  All physical exam findings normal, except those marked:  General:	Alert, active, cooperative, NAD, well hydrated  Neck		Normal: supple, no cervical adenopathy, no palpable thyroid  Cardiovascular	Normal: regular rate, normal S1, S2, no murmurs  Respiratory	Normal: no chest wall deformity, normal respiratory pattern, CTA B/L  Abdominal	Normal: soft, ND, NT, bowel sounds present, no masses, no organomegaly  		Normal normal genitalia, testes descended, circumcised/uncircumcised  .		Cristian stage:			Breast cristian:  .		Menstrual history:  Extremities	Normal: FROM x4  Skin		Normal: intact and not indurated, no rash, no acanthosis nigricans  Neurologic	Normal: grossly intact    LABS  VBG - ( 08 Aug 2023 13:18 )  pH: 7.29  /  pCO2: 51    /  pO2: 22    / HCO3: 24    / Base Excess: -2.5  /  SvO2: 27.4  / Lactate: 4.40                           10.4   7.51  )-----------( 409      ( 10 Aug 2023 06:50 )             31.3     08-10    137  |  100  |  15  ----------------------------<  253<H>  4.8   |  26  |  0.9    Ca    8.8      10 Aug 2023 06:50  Phos  3.5     08-08  Mg     1.8     08-10    TPro  6.0  /  Alb  3.1<L>  /  TBili  0.3  /  DBili  x   /  AST  34  /  ALT  48<H>  /  AlkPhos  69  08-10      Ketone - Urine: Trace mg/dL (08-08 @ 13:23)    CAPILLARY BLOOD GLUCOSE      POCT Blood Glucose.: 297 mg/dL (10 Aug 2023 12:01)  POCT Blood Glucose.: 251 mg/dL (10 Aug 2023 08:07)  POCT Blood Glucose.: 180 mg/dL (09 Aug 2023 22:24)  POCT Blood Glucose.: 155 mg/dL (09 Aug 2023 20:50)  POCT Blood Glucose.: 314 mg/dL (09 Aug 2023 16:24)   Request for consultation:  Requested by:    Patient is a 63y old  Male who presents with a chief complaint of Burning urination (10 Aug 2023 10:19)    HPI:  A 63 Y/M with Pmhx of HTN, HLD, DM, papillary urothelial bladder cancer s/p tranurethral resection(6/2022) and BCG treatment(last cycle on 7/27/2023) presented to ED with the complains of burning urination with increased frequency. The patient went to visit his PCP(8 days back) with these complains and was found to have UTI and was started on Ciprofloxacin. Today the patient had fever of 101 F and went to follow up with his PCP and was found to have increased serum glucose level, therefore was sent to ED. No abdominal pain, vomiting, headache, SOB, chest pain, dizziness or LOC.     #ED vitals:  T(C): 36.8 (08-08-23 @ 15:41), Max: 37 (08-08-23 @ 11:38)  HR: 87 (08-08-23 @ 15:41) (82 - 87)  BP: 161/73 (08-08-23 @ 15:41) (132/74 - 161/73)  RR: 18 (08-08-23 @ 15:41) (18 - 18)  SpO2: 97% (08-08-23 @ 15:41) (97% - 99%)    #Labs significant for:  Glucose 449, Na 130, Cr 1.4(BL 1.1), AST 44, ALT 46, Anion Gap 19, Beta hydroxybutyrate 0.6, VBG: pH 7.29, Lactate 4.4, pCo2 51, UA positive    #S/P: Cefepime 2gm IV x 1, LR bolus 2L in the ED.    The patient is being admitted for the further management of UTI and DKA.     (08 Aug 2023 19:38)    Pt admitted to the hospital for DKA and UTI. Now s/p insulin drip with AG 19 -> 11, transitioned to subq insulin Lantus 28, Lispro 8/8/8 with ISS 1. Increased to Lantus 32, lispro 10/10/10. Pt remains slightly hyperglycemic on current regimen requiring up to 6 extra units of insulin through sliding scale. Pt was taken off januvia and glipizide 2 days PTA and started on lantus 20u daily 2 days PTA. A1c 9.6. Pt will be switched to basal/bolus insulin on DC (36/12/12/12) with outpt f/u with Dr. Lake. Metformin to be DCed on discharge.     FAMILY HISTORY:    PAST MEDICAL & SURGICAL HISTORY:  DM (diabetes mellitus)      HTN (hypertension)      Hypercholesterolemia  NO MEDS      Bladder cancer      History of cholecystectomy      History of appendectomy      History of tonsillectomy      H/O cataract extraction  b/l eyes with b/l iol's        Birth History:  Developmental History:    Review of Systems:  All review of systems negative, except for those marked:  General:		[] Abnormal:  Pulmonary:		[] Abnormal:  Cardiac:		[] Abnormal:  Gastrointestinal:	[] Abnormal:  ENT:			[] Abnormal:  Renal/Urologic:		[] Abnormal:  Musculoskeletal:	[] Abnormal:  Endocrine:		[] Abnormal:  Hematologic:		[] Abnormal:  Neurologic:		[] Abnormal:  Skin:			[] Abnormal:  Allergy/Immune:	[] Abnormal:  Psychiatric:		[] Abnormal:    Allergies    No Known Allergies    Intolerances      MEDICATIONS  (STANDING):  chlorhexidine 2% Cloths 1 Application(s) Topical <User Schedule>  dextrose 5%. 1000 milliLiter(s) (50 mL/Hr) IV Continuous <Continuous>  dextrose 5%. 1000 milliLiter(s) (100 mL/Hr) IV Continuous <Continuous>  dextrose 50% Injectable 25 Gram(s) IV Push once  dextrose 50% Injectable 12.5 Gram(s) IV Push once  dextrose 50% Injectable 25 Gram(s) IV Push once  enalapril 5 milliGRAM(s) Oral daily  enoxaparin Injectable 40 milliGRAM(s) SubCutaneous every 24 hours  glucagon  Injectable 1 milliGRAM(s) IntraMuscular once  insulin glargine Injectable (LANTUS) 32 Unit(s) SubCutaneous at bedtime  insulin lispro (ADMELOG) corrective regimen sliding scale   SubCutaneous three times a day before meals  insulin lispro Injectable (ADMELOG) 10 Unit(s) SubCutaneous three times a day before meals  meropenem  IVPB      meropenem  IVPB 1000 milliGRAM(s) IV Intermittent every 8 hours    MEDICATIONS  (PRN):  acetaminophen     Tablet .. 650 milliGRAM(s) Oral every 6 hours PRN Temp greater or equal to 38C (100.4F), Mild Pain (1 - 3)  aluminum hydroxide/magnesium hydroxide/simethicone Suspension 30 milliLiter(s) Oral every 4 hours PRN Dyspepsia  dextrose Oral Gel 15 Gram(s) Oral once PRN Blood Glucose LESS THAN 70 milliGRAM(s)/deciliter  melatonin 3 milliGRAM(s) Oral at bedtime PRN Insomnia      Vital Signs Last 24 Hrs  T(C): 36.7 (10 Aug 2023 12:51), Max: 37.7 (09 Aug 2023 16:20)  T(F): 98.1 (10 Aug 2023 12:51), Max: 99.9 (09 Aug 2023 16:20)  HR: 16 (10 Aug 2023 12:51) (16 - 81)  BP: 139/81 (10 Aug 2023 12:51) (139/81 - 188/86)  BP(mean): 123 (09 Aug 2023 22:31) (105 - 123)  RR: 16 (10 Aug 2023 12:51) (16 - 27)  SpO2: 97% (10 Aug 2023 12:51) (96% - 98%)    Parameters below as of 09 Aug 2023 20:15  Patient On (Oxygen Delivery Method): room air      Height (cm): 180.3 (08-08 @ 21:04)  Weight (kg): 106.9 (08-08 @ 21:04)  BMI (kg/m2): 32.9 (08-08 @ 21:04)    PHYSICAL EXAM  All physical exam findings normal, except those marked:  General:	Alert, active, cooperative, NAD, well hydrated  Neck		Normal: supple, no cervical adenopathy, no palpable thyroid  Cardiovascular	Normal: regular rate, normal S1, S2, no murmurs  Respiratory	Normal: no chest wall deformity, normal respiratory pattern, CTA B/L  Abdominal	Normal: soft, ND, NT, bowel sounds present, no masses, no organomegaly  		Normal normal genitalia, testes descended, circumcised/uncircumcised  .		Cristian stage:			Breast crsitian:  .		Menstrual history:  Extremities	Normal: FROM x4  Skin		Normal: intact and not indurated, no rash, no acanthosis nigricans  Neurologic	Normal: grossly intact    LABS  VBG - ( 08 Aug 2023 13:18 )  pH: 7.29  /  pCO2: 51    /  pO2: 22    / HCO3: 24    / Base Excess: -2.5  /  SvO2: 27.4  / Lactate: 4.40                           10.4   7.51  )-----------( 409      ( 10 Aug 2023 06:50 )             31.3     08-10    137  |  100  |  15  ----------------------------<  253<H>  4.8   |  26  |  0.9    Ca    8.8      10 Aug 2023 06:50  Phos  3.5     08-08  Mg     1.8     08-10    TPro  6.0  /  Alb  3.1<L>  /  TBili  0.3  /  DBili  x   /  AST  34  /  ALT  48<H>  /  AlkPhos  69  08-10      Ketone - Urine: Trace mg/dL (08-08 @ 13:23)    CAPILLARY BLOOD GLUCOSE      POCT Blood Glucose.: 297 mg/dL (10 Aug 2023 12:01)  POCT Blood Glucose.: 251 mg/dL (10 Aug 2023 08:07)  POCT Blood Glucose.: 180 mg/dL (09 Aug 2023 22:24)  POCT Blood Glucose.: 155 mg/dL (09 Aug 2023 20:50)  POCT Blood Glucose.: 314 mg/dL (09 Aug 2023 16:24)

## 2023-08-10 NOTE — CONSULT NOTE ADULT - ASSESSMENT
63 Y/M with Pmhx of HTN, HLD, DM, papillary urothelial bladder cancer s/p tranurethral resection(6/2022) and BCG treatment(last cycle on 7/27/2023) presented to ED with the complains of burning urination with increased frequency. Admitted for UTI and DKA. s/p insulin drip for DKA now on insulin basal/bolus + ISS with intermittent hyperglycemia. Pt was taken off januvia and glipizide 2 days PTA and started on 20u lantus daily instead. Will stop metformin after discharge to prevent risk of LICO with possible CHICA in the future.    #DKA  #T2DM with hyperglycemia  - Admitted with UTI and mild DKA (BHB 0.6, glucose ~500s)   - s/p insulin drip, transitioned to subq insulin but remains hyperglycemic   - A1c 9.6%  - Was on metformin, glipizide, and januvia -> Glipizide and Januvia was DCed 2 days PTA and replaced with 20u lantus daily  - , 297  - On lantus 28, lispro 8/8/8    Recs  - Increase insulin to lantus 36u daily + Lispro 12/12/12  - DISCONTINUE metformin on discharge to eliminate risk of LICO in case pt presents with CHICA due to his Hx of active bladder cancer  - Please send script for lispro and insulin supplies on discharge  - When patient ready for discharge, send him home on insulin lantus 36u daily + Lispro 12/12/12  - Dr. Lake's office will call patient to schedule short-interval follow up appointment after discharge

## 2023-08-10 NOTE — PROGRESS NOTE ADULT - ASSESSMENT
A 63 Y/M with Pmhx of HTN, HLD, DM, papillary urothelial bladder cancer s/p transurethral resection(6/2022) and BCG treatment(last cycle on 7/27/2023) presented to ED with the complains of burning urination with increased frequency. The patient went to visit his PCP (8 days back) with these complains and was found to have UTI and was started on Ciprofloxacin. Prior to admission the patient had fever of 101 F and went to follow up with his PCP and was found to have increased serum glucose level, therefore was sent to ED. No abdominal pain, vomiting, headache, SOB, chest pain, dizziness or LOC. Patient admitted to CCU on 8/8 as a case of ALVARO + DKA. Patient started on insulin drip and eventually switched to sq insulin as the anion gap closed and patient resumed feeding. Patient downgraded to 3A on 8/9    #Mild DKA  - Acute lactic acidosis likely 2/2 metformin in the setting of renal impairment   - Prerenal azotemia now resolved  - Completed insulin gtt protocol for DKA  - HgbA1c 9.6  - Patient was on insulin drip, now receiving sq insulin   - Patient was receiving LR, but this was stopped as it is no longer needed   - F/u Endocrine consult   8/10: Lantus 36 Lispro 12 per endocrine. outpatient endocrine f/u with Dr. erickson in 2 weeks.     #Acute pyelonephritis with no obstructive uropathy   - 8/8 Urine cultures negative   - KUB 8/9 showing layering debris in the bladder, otherwise unremarkable   - Blood cultures NGTD  - Continue meropenem 1g IV q8hrs per ID   - According to ID, could change to PO vantin on 8/11 200 mg q12h till 8/22    #Papillary urothelial bladder cancer s/p transurethral resection (6/2022) and BCG treatment (last cycle on 7/27/2023)  - Patient to follow up outpatient upon discharge to continue BCG treatment     #DM  - Increased lantus to 36 at bedtime  - Lispro 12 TID before meals   - Monitor sugars     #HTN  - Continue enalapril 5mg daily    #Pseudohyponatremia - resolved     HANDOFF: likely d/c tomorrow

## 2023-08-10 NOTE — PROGRESS NOTE ADULT - GASTROINTESTINAL
soft/nontender/no guarding/no rigidity
normal/soft/nontender/nondistended/normal active bowel sounds

## 2023-08-10 NOTE — PROGRESS NOTE ADULT - ASSESSMENT
63 Y/M with Pmhx of HTN, HLD, DM, papillary urothelial bladder cancer s/p tranurethral resection(6/2022) and BCG treatment(last cycle on 7/27/2023) presented to ED with the complains of burning urination with increased frequency. The patient went to visit his PCP(8 days back) with these complains and was found to have UTI and was started on Ciprofloxacin. Today the patient had fever of 101 F and went to follow up with his PCP and was found to have increased serum glucose level, therefore was sent to ED.    IMPRESSION/RECOMMENDATIONS   Acute pyelonephritis with no obstructive uropathy  No patino  WBC 8.1  Was on po Cipro for 8 days PTA with no relief of symptoms  8/8 UCx NG  8/8 BCx NG  -Meropenem 1 gm iv q8h ( recent Cipro usage a risk factor for MDR GNR )  -Off loading to prevent pressure sores and preventive measures to avoid aspiration   -could change to po vantin on 8/11 200 mg q12h till 8/22    Please do not hesitate to recall ID if any questions arise either through [email or web address] or through [employer] teams

## 2023-08-10 NOTE — PROGRESS NOTE ADULT - SUBJECTIVE AND OBJECTIVE BOX
24H events:    Patient is a 63y old Male who presents with a chief complaint of Burning urination (09 Aug 2023 05:57)    Primary diagnosis of DKA (diabetic ketoacidosis)    Hospital course:   A 63 Y/M with Pmhx of HTN, HLD, DM, papillary urothelial bladder cancer s/p tranurethral resection(6/2022) and BCG treatment(last cycle on 7/27/2023) presented to ED with the complains of burning urination with increased frequency. The patient went to visit his PCP(8 days back) with these complains and was found to have UTI and was started on Ciprofloxacin. Prior to admission the patient had fever of 101 F and went to follow up with his PCP and was found to have increased serum glucose level, therefore was sent to ED. No abdominal pain, vomiting, headache, SOB, chest pain, dizziness or LOC.       In the ED he was given cefepine 2gm IV, LR bolus 2L, started on meropenem 1000mg q8hrs x 10days,  as well as given an insulin drip to manage his blood glucose.     8/10  Today is hospital day 2d. This morning patient was seen and examined at bedside, resting comfortably in bed.    No acute or major events overnight. He reports some difficulty controlling his urine. He explains that sometimes has incontinence since the start of the infection. Otherwise his symptoms are gradually improving, denies any HA, N/V, fevers. weakness, SOB, or palpitations.     Code Status:    Family communication:  Contact date:  Name of person contacted:  Relationship to patient:  Communication details:  What matters most:    PAST MEDICAL & SURGICAL HISTORY  DM (diabetes mellitus)    HTN (hypertension)    Hypercholesterolemia  NO MEDS    Bladder cancer    History of cholecystectomy    History of appendectomy    History of tonsillectomy    H/O cataract extraction  b/l eyes with b/l iol's      SOCIAL HISTORY:  Social History:      ALLERGIES:  No Known Allergies    MEDICATIONS:  STANDING MEDICATIONS  chlorhexidine 2% Cloths 1 Application(s) Topical <User Schedule>  dextrose 5%. 1000 milliLiter(s) IV Continuous <Continuous>  dextrose 5%. 1000 milliLiter(s) IV Continuous <Continuous>  dextrose 50% Injectable 25 Gram(s) IV Push once  dextrose 50% Injectable 12.5 Gram(s) IV Push once  dextrose 50% Injectable 25 Gram(s) IV Push once  enalapril 5 milliGRAM(s) Oral daily  enoxaparin Injectable 40 milliGRAM(s) SubCutaneous every 24 hours  glucagon  Injectable 1 milliGRAM(s) IntraMuscular once  insulin glargine Injectable (LANTUS) 28 Unit(s) SubCutaneous at bedtime  insulin lispro (ADMELOG) corrective regimen sliding scale   SubCutaneous three times a day before meals  insulin lispro Injectable (ADMELOG) 10 Unit(s) SubCutaneous three times a day before meals  lactated ringers. 1000 milliLiter(s) IV Continuous <Continuous>  meropenem  IVPB      meropenem  IVPB 1000 milliGRAM(s) IV Intermittent every 8 hours    PRN MEDICATIONS  acetaminophen     Tablet .. 650 milliGRAM(s) Oral every 6 hours PRN  aluminum hydroxide/magnesium hydroxide/simethicone Suspension 30 milliLiter(s) Oral every 4 hours PRN  dextrose Oral Gel 15 Gram(s) Oral once PRN  melatonin 3 milliGRAM(s) Oral at bedtime PRN    VITALS:   T(F): 97.5  HR: 81  BP: 159/70  RR: 18  SpO2: 97%    PHYSICAL EXAM:  GENERAL:   ( x ) NAD, lying in bed comfortably     (  ) obtunded     (  ) lethargic     (  ) somnolent    HEAD:   ( x ) Atraumatic     (  ) hematoma     (  ) laceration (specify location:       )     NECK:  ( x ) Supple     (  ) neck stiffness     (  ) nuchal rigidity     (  )  no JVD     (  ) JVD present ( -- cm)    HEART:  Rate -->     ( x ) normal rate     (  ) bradycardic     (  ) tachycardic  Rhythm -->     (x  ) regular     (  ) regularly irregular     (  ) irregularly irregular  Murmurs -->     ( x ) normal s1s2     (  ) systolic murmur     (  ) diastolic murmur     (  ) continuous murmur      (  ) S3 present     (  ) S4 present    LUNGS:   ( x )Unlabored respirations     (  ) tachypnea  ( x ) B/L air entry     (  ) decreased breath sounds in:  (location     )    (x  ) no adventitious sound     (  ) crackles     (  ) wheezing      (  ) rhonchi      (specify location:       )  (  ) chest wall tenderness (specify location:       )    ABDOMEN:   ( x ) Soft     (  ) tense   |   ( x ) nondistended     (  ) distended   |   (  ) +BS     (  ) hypoactive bowel sounds     (  ) hyperactive bowel sounds  ( x ) nontender     (  ) RUQ tenderness     (  ) RLQ tenderness     (  ) LLQ tenderness     (  ) epigastric tenderness     (  ) diffuse tenderness  (  ) Splenomegaly      (  ) Hepatomegaly      (  ) Jaundice     (  ) ecchymosis     EXTREMITIES:  ( x ) Normal     (  ) Rash     (  ) ecchymosis     (  ) varicose veins      (  ) pitting edema     (  ) non-pitting edema   (  ) ulceration     (  ) gangrene:     (location:     )    NERVOUS SYSTEM:    (  x) A&Ox3     (  ) confused     (  ) lethargic  CN II-XII:     (  x) Intact     (  ) deficits found     (Specify:     )   Upper extremities:     x ) no sensorimotor deficits     (  ) weakness     (  ) loss of proprioception/vibration     (  ) loss of touch/temperature (specify:    )  Lower extremities:     ( x ) no sensorimotor deficits     (  ) weakness     (  ) loss of proprioception/vibration     (  ) loss of touch/temperature (specify:    )    SKIN:   ( x ) No rashes or lesions     (  ) maculopapular rash     (  ) pustules     (  ) vesicles     (  ) ulcer     (  ) ecchymosis     (specify location:     )    AMPAC score:    (  ) Indwelling Angel Catheter:   Date insterted:    Reason (  ) Critical illness     (  ) urinary retention    (  ) Accurate Ins/Outs Monitoring     (  ) CMO patient    (  ) Central Line:   Date inserted:  Location: (  ) Right IJ     (  ) Left IJ     (  ) Right Fem     (  ) Left Fem    (  ) SPC        (  ) pigtail       (  ) PEG tube       (  ) colostomy       (  ) jejunostomy  (  ) U-Dall    LABS:                        10.4   7.51  )-----------( 409      ( 10 Aug 2023 06:50 )             31.3     08-10    137  |  100  |  15  ----------------------------<  253<H>  4.8   |  26  |  0.9    Ca    8.8      10 Aug 2023 06:50  Phos  3.5     08-08  Mg     1.8     08-10    TPro  6.0  /  Alb  3.1<L>  /  TBili  0.3  /  DBili  x   /  AST  34  /  ALT  48<H>  /  AlkPhos  69  08-10      Urinalysis Basic - ( 10 Aug 2023 06:50 )    Color: x / Appearance: x / SG: x / pH: x  Gluc: 253 mg/dL / Ketone: x  / Bili: x / Urobili: x   Blood: x / Protein: x / Nitrite: x   Leuk Esterase: x / RBC: x / WBC x   Sq Epi: x / Non Sq Epi: x / Bacteria: x            Culture - Blood (collected 08 Aug 2023 17:44)  Source: .Blood Blood  Preliminary Report (10 Aug 2023 01:02):    No growth at 24 hours    Culture - Blood (collected 08 Aug 2023 15:06)  Source: .Blood Blood  Preliminary Report (09 Aug 2023 23:01):    No growth at 24 hours    Culture - Urine (collected 08 Aug 2023 13:23)  Source: Clean Catch Clean Catch (Midstream)  Final Report (09 Aug 2023 19:01):    <10,000 CFU/mL Normal Urogenital Darline          RADIOLOGY:           24H events:    Patient is a 63y old Male who presents with a chief complaint of Burning urination (09 Aug 2023 05:57)    Primary diagnosis of DKA (diabetic ketoacidosis)    Hospital course:   A 63 Y/M with Pmhx of HTN, HLD, DM, papillary urothelial bladder cancer s/p transurethral resection(6/2022) and BCG treatment(last cycle on 7/27/2023) presented to ED with the complains of burning urination with increased frequency. The patient went to visit his PCP(8 days back) with these complains and was found to have UTI and was started on Ciprofloxacin. Prior to admission the patient had fever of 101 F and went to follow up with his PCP and was found to have increased serum glucose level, therefore was sent to ED. No abdominal pain, vomiting, headache, SOB, chest pain, dizziness or LOC.     In the ED he was given cefepime 2gm IV, LR bolus 2L, started on meropenem 1000mg q8hrs x 10days,  as well as given an insulin drip to manage his blood glucose.     CCU course:   Admitted on 8/8/2023 At night as a case of UTI + DKA.  Patient was in mild DKA with B-hydroxyb of 0.6 and oh of 7.29 , hco3 of 20 and glucose of 500's , AG was 19 but lactate was 4.4   Patient was started on insulin drip which was stopped at 1:30 am of 8-9-2023.   Patient was switched to SQ insulin and labs showed closure of AG and patient resumed feeding.   To be continued on IV meropenem for UTI and F/U cultures, ID on board.     8/10  Today is hospital day 2d. This morning patient was seen and examined at bedside, resting comfortably in bed.    No acute or major events overnight. He reports some difficulty controlling his urine. He explains that sometimes has incontinence since the start of the infection. Otherwise his symptoms are gradually improving, denies any HA, N/V, fevers. weakness, SOB, or palpitations.     Code Status: Full Code    Family communication:  Contact date:  Name of person contacted:  Relationship to patient:  Communication details:  What matters most:    PAST MEDICAL & SURGICAL HISTORY  DM (diabetes mellitus)    HTN (hypertension)    Hypercholesterolemia  NO MEDS    Bladder cancer    History of cholecystectomy    History of appendectomy    History of tonsillectomy    H/O cataract extraction  b/l eyes with b/l iol's      SOCIAL HISTORY:  Social History:      ALLERGIES:  No Known Allergies    MEDICATIONS:  STANDING MEDICATIONS  chlorhexidine 2% Cloths 1 Application(s) Topical <User Schedule>  dextrose 5%. 1000 milliLiter(s) IV Continuous <Continuous>  dextrose 5%. 1000 milliLiter(s) IV Continuous <Continuous>  dextrose 50% Injectable 25 Gram(s) IV Push once  dextrose 50% Injectable 12.5 Gram(s) IV Push once  dextrose 50% Injectable 25 Gram(s) IV Push once  enalapril 5 milliGRAM(s) Oral daily  enoxaparin Injectable 40 milliGRAM(s) SubCutaneous every 24 hours  glucagon  Injectable 1 milliGRAM(s) IntraMuscular once  insulin glargine Injectable (LANTUS) 28 Unit(s) SubCutaneous at bedtime  insulin lispro (ADMELOG) corrective regimen sliding scale   SubCutaneous three times a day before meals  insulin lispro Injectable (ADMELOG) 10 Unit(s) SubCutaneous three times a day before meals  lactated ringers. 1000 milliLiter(s) IV Continuous <Continuous>  meropenem  IVPB      meropenem  IVPB 1000 milliGRAM(s) IV Intermittent every 8 hours    PRN MEDICATIONS  acetaminophen     Tablet .. 650 milliGRAM(s) Oral every 6 hours PRN  aluminum hydroxide/magnesium hydroxide/simethicone Suspension 30 milliLiter(s) Oral every 4 hours PRN  dextrose Oral Gel 15 Gram(s) Oral once PRN  melatonin 3 milliGRAM(s) Oral at bedtime PRN    VITALS:   T(F): 97.5  HR: 81  BP: 159/70  RR: 18  SpO2: 97%    PHYSICAL EXAM:  GENERAL:   ( x ) NAD, lying in bed comfortably     (  ) obtunded     (  ) lethargic     (  ) somnolent    HEAD:   ( x ) Atraumatic     (  ) hematoma     (  ) laceration (specify location:       )     NECK:  ( x ) Supple     (  ) neck stiffness     (  ) nuchal rigidity     (  )  no JVD     (  ) JVD present ( -- cm)    HEART:  Rate -->     ( x ) normal rate     (  ) bradycardic     (  ) tachycardic  Rhythm -->     (x  ) regular     (  ) regularly irregular     (  ) irregularly irregular  Murmurs -->     ( x ) normal s1s2     (  ) systolic murmur     (  ) diastolic murmur     (  ) continuous murmur      (  ) S3 present     (  ) S4 present    LUNGS:   ( x )Unlabored respirations     (  ) tachypnea  ( x ) B/L air entry     (  ) decreased breath sounds in:  (location     )    (x  ) no adventitious sound     (  ) crackles     (  ) wheezing      (  ) rhonchi      (specify location:       )  (  ) chest wall tenderness (specify location:       )    ABDOMEN:   ( x ) Soft     (  ) tense   |   ( x ) nondistended     (  ) distended   |   (  ) +BS     (  ) hypoactive bowel sounds     (  ) hyperactive bowel sounds  ( x ) nontender     (  ) RUQ tenderness     (  ) RLQ tenderness     (  ) LLQ tenderness     (  ) epigastric tenderness     (  ) diffuse tenderness  (  ) Splenomegaly      (  ) Hepatomegaly      (  ) Jaundice     (  ) ecchymosis     EXTREMITIES:  ( x ) Normal     (  ) Rash     (  ) ecchymosis     (  ) varicose veins      (  ) pitting edema     (  ) non-pitting edema   (  ) ulceration     (  ) gangrene:     (location:     )    NERVOUS SYSTEM:    (  x) A&Ox3     (  ) confused     (  ) lethargic  CN II-XII:     (  x) Intact     (  ) deficits found     (Specify:     )   Upper extremities:     x ) no sensorimotor deficits     (  ) weakness     (  ) loss of proprioception/vibration     (  ) loss of touch/temperature (specify:    )  Lower extremities:     ( x ) no sensorimotor deficits     (  ) weakness     (  ) loss of proprioception/vibration     (  ) loss of touch/temperature (specify:    )    SKIN:   ( x ) No rashes or lesions     (  ) maculopapular rash     (  ) pustules     (  ) vesicles     (  ) ulcer     (  ) ecchymosis     (specify location:     )    AMPAC score:    (  ) Indwelling Angel Catheter:   Date insterted:    Reason (  ) Critical illness     (  ) urinary retention    (  ) Accurate Ins/Outs Monitoring     (  ) CMO patient    (  ) Central Line:   Date inserted:  Location: (  ) Right IJ     (  ) Left IJ     (  ) Right Fem     (  ) Left Fem    (  ) SPC        (  ) pigtail       (  ) PEG tube       (  ) colostomy       (  ) jejunostomy  (  ) U-Dall    LABS:                        10.4   7.51  )-----------( 409      ( 10 Aug 2023 06:50 )             31.3     08-10    137  |  100  |  15  ----------------------------<  253<H>  4.8   |  26  |  0.9    Ca    8.8      10 Aug 2023 06:50  Phos  3.5     08-08  Mg     1.8     08-10    TPro  6.0  /  Alb  3.1<L>  /  TBili  0.3  /  DBili  x   /  AST  34  /  ALT  48<H>  /  AlkPhos  69  08-10      Urinalysis Basic - ( 10 Aug 2023 06:50 )    Color: x / Appearance: x / SG: x / pH: x  Gluc: 253 mg/dL / Ketone: x  / Bili: x / Urobili: x   Blood: x / Protein: x / Nitrite: x   Leuk Esterase: x / RBC: x / WBC x   Sq Epi: x / Non Sq Epi: x / Bacteria: x            Culture - Blood (collected 08 Aug 2023 17:44)  Source: .Blood Blood  Preliminary Report (10 Aug 2023 01:02):    No growth at 24 hours    Culture - Blood (collected 08 Aug 2023 15:06)  Source: .Blood Blood  Preliminary Report (09 Aug 2023 23:01):    No growth at 24 hours    Culture - Urine (collected 08 Aug 2023 13:23)  Source: Clean Catch Clean Catch (Midstream)  Final Report (09 Aug 2023 19:01):    <10,000 CFU/mL Normal Urogenital Darline           24H events:    Patient is a 63y old Male who presents with a chief complaint of Burning urination (09 Aug 2023 05:57)    Primary diagnosis of DKA (diabetic ketoacidosis)    Hospital course:   A 63 Y/M with Pmhx of HTN, HLD, DM, papillary urothelial bladder cancer s/p transurethral resection(6/2022) and BCG treatment(last cycle on 7/27/2023) presented to ED with the complains of burning urination with increased frequency. The patient went to visit his PCP(8 days back) with these complains and was found to have UTI and was started on Ciprofloxacin. Prior to admission the patient had fever of 101 F and went to follow up with his PCP and was found to have increased serum glucose level, therefore was sent to ED. No abdominal pain, vomiting, headache, SOB, chest pain, dizziness or LOC.     In the ED he was given cefepime 2gm IV, LR bolus 2L, started on meropenem 1000mg q8hrs x 10days,  as well as given an insulin drip to manage his blood glucose.     CCU course:   Admitted on 8/8/2023 At night as a case of UTI + DKA.  Patient was in mild DKA with B-hydroxyb of 0.6 and oh of 7.29 , hco3 of 20 and glucose of 500's , AG was 19 but lactate was 4.4   Patient was started on insulin drip which was stopped at 1:30 am of 8-9-2023.   Patient was switched to SQ insulin and labs showed closure of AG and patient resumed feeding.   To be continued on IV meropenem for UTI and F/U cultures, ID on board.     8/10  Today is hospital day 2d. This morning patient was seen and examined at bedside, resting comfortably in bed.    No acute or major events overnight. He reports some difficulty controlling his urine. He explains that sometimes has incontinence since the start of the infection. Otherwise his symptoms are gradually improving, denies any HA, N/V, fevers, weakness, SOB, or palpitations.     Code Status: Full Code    Family communication:  Contact date:  Name of person contacted:  Relationship to patient:  Communication details:  What matters most:    PAST MEDICAL & SURGICAL HISTORY  DM (diabetes mellitus)    HTN (hypertension)    Hypercholesterolemia  NO MEDS    Bladder cancer    History of cholecystectomy    History of appendectomy    History of tonsillectomy    H/O cataract extraction  b/l eyes with b/l iol's      SOCIAL HISTORY:  Social History:      ALLERGIES:  No Known Allergies    MEDICATIONS:  STANDING MEDICATIONS  chlorhexidine 2% Cloths 1 Application(s) Topical <User Schedule>  dextrose 5%. 1000 milliLiter(s) IV Continuous <Continuous>  dextrose 5%. 1000 milliLiter(s) IV Continuous <Continuous>  dextrose 50% Injectable 25 Gram(s) IV Push once  dextrose 50% Injectable 12.5 Gram(s) IV Push once  dextrose 50% Injectable 25 Gram(s) IV Push once  enalapril 5 milliGRAM(s) Oral daily  enoxaparin Injectable 40 milliGRAM(s) SubCutaneous every 24 hours  glucagon  Injectable 1 milliGRAM(s) IntraMuscular once  insulin glargine Injectable (LANTUS) 28 Unit(s) SubCutaneous at bedtime  insulin lispro (ADMELOG) corrective regimen sliding scale   SubCutaneous three times a day before meals  insulin lispro Injectable (ADMELOG) 10 Unit(s) SubCutaneous three times a day before meals  lactated ringers. 1000 milliLiter(s) IV Continuous <Continuous>  meropenem  IVPB      meropenem  IVPB 1000 milliGRAM(s) IV Intermittent every 8 hours    PRN MEDICATIONS  acetaminophen     Tablet .. 650 milliGRAM(s) Oral every 6 hours PRN  aluminum hydroxide/magnesium hydroxide/simethicone Suspension 30 milliLiter(s) Oral every 4 hours PRN  dextrose Oral Gel 15 Gram(s) Oral once PRN  melatonin 3 milliGRAM(s) Oral at bedtime PRN    VITALS:   T(F): 97.5  HR: 81  BP: 159/70  RR: 18  SpO2: 97%    PHYSICAL EXAM:  GENERAL:   ( x ) NAD, lying in bed comfortably     (  ) obtunded     (  ) lethargic     (  ) somnolent    HEAD:   ( x ) Atraumatic     (  ) hematoma     (  ) laceration (specify location:       )     NECK:  ( x ) Supple     (  ) neck stiffness     (  ) nuchal rigidity     (  )  no JVD     (  ) JVD present ( -- cm)    HEART:  Rate -->     ( x ) normal rate     (  ) bradycardic     (  ) tachycardic  Rhythm -->     (x  ) regular     (  ) regularly irregular     (  ) irregularly irregular  Murmurs -->     ( x ) normal s1s2     (  ) systolic murmur     (  ) diastolic murmur     (  ) continuous murmur      (  ) S3 present     (  ) S4 present    LUNGS:   ( x )Unlabored respirations     (  ) tachypnea  ( x ) B/L air entry     (  ) decreased breath sounds in:  (location     )    (x  ) no adventitious sound     (  ) crackles     (  ) wheezing      (  ) rhonchi      (specify location:       )  (  ) chest wall tenderness (specify location:       )    ABDOMEN:   ( x ) Soft     (  ) tense   |   ( x ) nondistended     (  ) distended   |   (  ) +BS     (  ) hypoactive bowel sounds     (  ) hyperactive bowel sounds  ( x ) nontender     (  ) RUQ tenderness     (  ) RLQ tenderness     (  ) LLQ tenderness     (  ) epigastric tenderness     (  ) diffuse tenderness  (  ) Splenomegaly      (  ) Hepatomegaly      (  ) Jaundice     (  ) ecchymosis     EXTREMITIES:  ( x ) Normal     (  ) Rash     (  ) ecchymosis     (  ) varicose veins      (  ) pitting edema     (  ) non-pitting edema   (  ) ulceration     (  ) gangrene:     (location:     )    NERVOUS SYSTEM:    ( x ) A&Ox3     (  ) confused     (  ) lethargic  CN II-XII:     ( x ) Intact     (  ) deficits found     (Specify:     )   Upper extremities:    ( x ) no sensorimotor deficits     (  ) weakness     (  ) loss of proprioception/vibration     (  ) loss of touch/temperature (specify:    )  Lower extremities:     ( x ) no sensorimotor deficits     (  ) weakness     (  ) loss of proprioception/vibration     (  ) loss of touch/temperature (specify:    )    SKIN:   ( x ) No rashes or lesions     (  ) maculopapular rash     (  ) pustules     (  ) vesicles     (  ) ulcer     (  ) ecchymosis     (specify location:     )    AMPAC score:    (  ) Indwelling Angel Catheter:   Date insterted:    Reason (  ) Critical illness     (  ) urinary retention    (  ) Accurate Ins/Outs Monitoring     (  ) CMO patient    (  ) Central Line:   Date inserted:  Location: (  ) Right IJ     (  ) Left IJ     (  ) Right Fem     (  ) Left Fem    (  ) SPC        (  ) pigtail       (  ) PEG tube       (  ) colostomy       (  ) jejunostomy  (  ) U-Dall    LABS:                        10.4   7.51  )-----------( 409      ( 10 Aug 2023 06:50 )             31.3     08-10    137  |  100  |  15  ----------------------------<  253<H>  4.8   |  26  |  0.9    Ca    8.8      10 Aug 2023 06:50  Phos  3.5     08-08  Mg     1.8     08-10    TPro  6.0  /  Alb  3.1<L>  /  TBili  0.3  /  DBili  x   /  AST  34  /  ALT  48<H>  /  AlkPhos  69  08-10      Urinalysis Basic - ( 10 Aug 2023 06:50 )    Color: x / Appearance: x / SG: x / pH: x  Gluc: 253 mg/dL / Ketone: x  / Bili: x / Urobili: x   Blood: x / Protein: x / Nitrite: x   Leuk Esterase: x / RBC: x / WBC x   Sq Epi: x / Non Sq Epi: x / Bacteria: x            Culture - Blood (collected 08 Aug 2023 17:44)  Source: .Blood Blood  Preliminary Report (10 Aug 2023 01:02):    No growth at 24 hours    Culture - Blood (collected 08 Aug 2023 15:06)  Source: .Blood Blood  Preliminary Report (09 Aug 2023 23:01):    No growth at 24 hours    Culture - Urine (collected 08 Aug 2023 13:23)  Source: Clean Catch Clean Catch (Midstream)  Final Report (09 Aug 2023 19:01):    <10,000 CFU/mL Normal Urogenital Darline

## 2023-08-10 NOTE — CONSULT NOTE ADULT - ATTENDING COMMENTS
Type 2 DM -uncontrolled, on oral hypoglycemic agents up until 2 days PTA.  BS improved since admission.  Does not need to remain in hospital just for diabetes-recommend home on Lantus 36 units qHs and Humalog 12 units tid ac.  (Pt has Lantus at home-will need rx for Humalog and pen needles (4x/day).)  Has glucometer and supplies.  Will f/u in office in 2-3 weeks.  No oral hypoglycemic agents for now.

## 2023-08-10 NOTE — PROGRESS NOTE ADULT - SUBJECTIVE AND OBJECTIVE BOX
KIKA SAMANIEGO  63y, Male    All available historical data reviewed    OVERNIGHT EVENTS:  no fevers  dysuria improved    ROS:  General: Denies rigors, nightsweats  HEENT: Denies headache, rhinorrhea, sore throat, eye pain  CV: Denies CP, palpitations  PULM: Denies wheezing, hemoptysis  GI: Denies hematemesis, hematochezia, melena  : Denies discharge, hematuria  MSK: Denies arthralgias, myalgias  SKIN: Denies rash, lesions  NEURO: Denies paresthesias, weakness  PSYCH: Denies depression, anxiety    VITALS:  T(F): 97.5, Max: 99.9 (08-09-23 @ 16:20)  HR: 81  BP: 159/70  RR: 18Vital Signs Last 24 Hrs  T(C): 36.4 (10 Aug 2023 05:00), Max: 37.7 (09 Aug 2023 16:20)  T(F): 97.5 (10 Aug 2023 05:00), Max: 99.9 (09 Aug 2023 16:20)  HR: 81 (10 Aug 2023 05:00) (74 - 81)  BP: 159/70 (10 Aug 2023 05:00) (145/71 - 188/86)  BP(mean): 123 (09 Aug 2023 22:31) (101 - 123)  RR: 18 (10 Aug 2023 05:00) (18 - 31)  SpO2: 97% (09 Aug 2023 22:31) (96% - 98%)    Parameters below as of 09 Aug 2023 20:15  Patient On (Oxygen Delivery Method): room air        TESTS & MEASUREMENTS:                        10.4   7.51  )-----------( 409      ( 10 Aug 2023 06:50 )             31.3     08-10    137  |  100  |  15  ----------------------------<  253<H>  4.8   |  26  |  0.9    Ca    8.8      10 Aug 2023 06:50  Phos  3.5     08-08  Mg     1.8     08-10    TPro  6.0  /  Alb  3.1<L>  /  TBili  0.3  /  DBili  x   /  AST  34  /  ALT  48<H>  /  AlkPhos  69  08-10    LIVER FUNCTIONS - ( 10 Aug 2023 06:50 )  Alb: 3.1 g/dL / Pro: 6.0 g/dL / ALK PHOS: 69 U/L / ALT: 48 U/L / AST: 34 U/L / GGT: x             Culture - Blood (collected 08-08-23 @ 17:44)  Source: .Blood Blood  Preliminary Report (08-10-23 @ 01:02):    No growth at 24 hours    Culture - Blood (collected 08-08-23 @ 15:06)  Source: .Blood Blood  Preliminary Report (08-09-23 @ 23:01):    No growth at 24 hours    Culture - Urine (collected 08-08-23 @ 13:23)  Source: Clean Catch Clean Catch (Midstream)  Final Report (08-09-23 @ 19:01):    <10,000 CFU/mL Normal Urogenital Darline      Urinalysis Basic - ( 10 Aug 2023 06:50 )    Color: x / Appearance: x / SG: x / pH: x  Gluc: 253 mg/dL / Ketone: x  / Bili: x / Urobili: x   Blood: x / Protein: x / Nitrite: x   Leuk Esterase: x / RBC: x / WBC x   Sq Epi: x / Non Sq Epi: x / Bacteria: x          RADIOLOGY & ADDITIONAL TESTS:  Personal review of radiological diagnostics performed  Echo and EKG results noted when applicable.     MEDICATIONS:  acetaminophen     Tablet .. 650 milliGRAM(s) Oral every 6 hours PRN  aluminum hydroxide/magnesium hydroxide/simethicone Suspension 30 milliLiter(s) Oral every 4 hours PRN  chlorhexidine 2% Cloths 1 Application(s) Topical <User Schedule>  dextrose 5%. 1000 milliLiter(s) IV Continuous <Continuous>  dextrose 5%. 1000 milliLiter(s) IV Continuous <Continuous>  dextrose 50% Injectable 25 Gram(s) IV Push once  dextrose 50% Injectable 12.5 Gram(s) IV Push once  dextrose 50% Injectable 25 Gram(s) IV Push once  dextrose Oral Gel 15 Gram(s) Oral once PRN  enalapril 5 milliGRAM(s) Oral daily  enoxaparin Injectable 40 milliGRAM(s) SubCutaneous every 24 hours  glucagon  Injectable 1 milliGRAM(s) IntraMuscular once  insulin glargine Injectable (LANTUS) 28 Unit(s) SubCutaneous at bedtime  insulin lispro (ADMELOG) corrective regimen sliding scale   SubCutaneous three times a day before meals  insulin lispro Injectable (ADMELOG) 10 Unit(s) SubCutaneous three times a day before meals  lactated ringers. 1000 milliLiter(s) IV Continuous <Continuous>  melatonin 3 milliGRAM(s) Oral at bedtime PRN  meropenem  IVPB      meropenem  IVPB 1000 milliGRAM(s) IV Intermittent every 8 hours      ANTIBIOTICS:  meropenem  IVPB      meropenem  IVPB 1000 milliGRAM(s) IV Intermittent every 8 hours

## 2023-08-10 NOTE — PROGRESS NOTE ADULT - ASSESSMENT
63 Y/M with Pmhx of HTN, HLD, DM, papillary urothelial bladder cancer s/p tranurethral resection(6/2022) and BCG treatment(last cycle on 7/27/2023) presented to ED with the complains of burning urination with increased frequency.    #Bladder cancer   #UTI    A 63 Y/M with Pmhx of HTN, HLD, DM, papillary urothelial bladder cancer s/p transurethral resection(6/2022) and BCG treatment(last cycle on 7/27/2023) presented to ED with the complains of burning urination with increased frequency. The patient went to visit his PCP(8 days back) with these complains and was found to have UTI and was started on Ciprofloxacin. Prior to admission the patient had fever of 101 F and went to follow up with his PCP and was found to have increased serum glucose level, therefore was sent to ED. No abdominal pain, vomiting, headache, SOB, chest pain, dizziness or LOC. Patient admitted to CCU on 8/8 as a case of ALVARO + DKA. Patient started on insulin drop and eventually switched to sq insulin as the anion gap closed and patient resumed feeding. Patient downgraded to 3A on 8/9    #DKA  - Acute lactic acidosis likely 2/2 metformin in the setting of renal impairment   - Prerenal azotemia now resolved  - Completed insulin gtt protocol for DKA  - HgbA1c 9.6  - Patient was on insulin drip, now receiving sq insulin   - Continue LR  - F/u Endocrine consult     #UTI  - 8/8 Urine cultures negative   - Blood cultures NGTD  - Continue meropenem 1g IV q8hrs per ID     #HTN  - Continue enalapril 5mg daily    #Pseudohyponatremia - resolved  A 63 Y/M with Pmhx of HTN, HLD, DM, papillary urothelial bladder cancer s/p transurethral resection(6/2022) and BCG treatment(last cycle on 7/27/2023) presented to ED with the complains of burning urination with increased frequency. The patient went to visit his PCP (8 days back) with these complains and was found to have UTI and was started on Ciprofloxacin. Prior to admission the patient had fever of 101 F and went to follow up with his PCP and was found to have increased serum glucose level, therefore was sent to ED. No abdominal pain, vomiting, headache, SOB, chest pain, dizziness or LOC. Patient admitted to CCU on 8/8 as a case of ALVARO + DKA. Patient started on insulin drip and eventually switched to sq insulin as the anion gap closed and patient resumed feeding. Patient downgraded to 3A on 8/9    #Mild DKA  - Acute lactic acidosis likely 2/2 metformin in the setting of renal impairment   - Prerenal azotemia now resolved  - Completed insulin gtt protocol for DKA  - HgbA1c 9.6  - Patient was on insulin drip, now receiving sq insulin   - Patient was receiving LR, but this was stopped as it is no longer needed   - F/u Endocrine consult     #Acute pyelonephritis with no obstructive uropathy   - 8/8 Urine cultures negative   - KUB 8/9 showing layering debris in the bladder, otherwise unremarkable   - Blood cultures NGTD  - Continue meropenem 1g IV q8hrs per ID   - According to ID, could change to PO vantin on 8/11 200 mg q12h till 8/22    #Papillary urothelial bladder cancer s/p transurethral resection (6/2022) and BCG treatment (last cycle on 7/27/2023)  - Patient to follow up outpatient upon discharge to continue BCG treatment     #DM  - Increased lantus to 32 at bedtime  - Lispro 10 TID before meals   - Monitor sugars     #HTN  - Continue enalapril 5mg daily    #Pseudohyponatremia - resolved  A 63 Y/M with Pmhx of HTN, HLD, DM, papillary urothelial bladder cancer s/p transurethral resection(6/2022) and BCG treatment(last cycle on 7/27/2023) presented to ED with the complains of burning urination with increased frequency. The patient went to visit his PCP (8 days back) with these complains and was found to have UTI and was started on Ciprofloxacin. Prior to admission the patient had fever of 101 F and went to follow up with his PCP and was found to have increased serum glucose level, therefore was sent to ED. No abdominal pain, vomiting, headache, SOB, chest pain, dizziness or LOC. Patient admitted to CCU on 8/8 as a case of ALVARO + DKA. Patient started on insulin drip and eventually switched to sq insulin as the anion gap closed and patient resumed feeding. Patient downgraded to 3A on 8/9    #Mild DKA  - Acute lactic acidosis likely 2/2 metformin in the setting of renal impairment   - Prerenal azotemia now resolved  - Completed insulin gtt protocol for DKA  - HgbA1c 9.6  - Patient was on insulin drip, now receiving sq insulin   - Patient was receiving LR, but this was stopped as it is no longer needed   - F/u Endocrine consult     #Acute pyelonephritis with no obstructive uropathy   - 8/8 Urine cultures negative   - KUB 8/9 showing layering debris in the bladder, otherwise unremarkable   - Blood cultures NGTD  - Continue meropenem 1g IV q8hrs per ID   - According to ID, could change to PO vantin on 8/11 200 mg q12h till 8/22    #Papillary urothelial bladder cancer s/p transurethral resection (6/2022) and BCG treatment (last cycle on 7/27/2023)  - Patient to follow up outpatient upon discharge to continue BCG treatment     #DM  - Increased lantus to 36 at bedtime  - Lispro 12 TID before meals   - Monitor sugars     #HTN  - Continue enalapril 5mg daily    #Pseudohyponatremia - resolved

## 2023-08-10 NOTE — PROGRESS NOTE ADULT - MUSCULOSKELETAL
normal/ROM intact/normal gait/strength 5/5 bilateral upper extremities/strength 5/5 bilateral lower extremities
normal/ROM intact/normal gait/strength 5/5 bilateral upper extremities/strength 5/5 bilateral lower extremities

## 2023-08-11 ENCOUNTER — TRANSCRIPTION ENCOUNTER (OUTPATIENT)
Age: 63
End: 2023-08-11

## 2023-08-11 VITALS
HEART RATE: 87 BPM | RESPIRATION RATE: 18 BRPM | OXYGEN SATURATION: 99 % | SYSTOLIC BLOOD PRESSURE: 165 MMHG | DIASTOLIC BLOOD PRESSURE: 70 MMHG | TEMPERATURE: 99 F

## 2023-08-11 LAB
ALBUMIN SERPL ELPH-MCNC: 3.2 G/DL — LOW (ref 3.5–5.2)
ALP SERPL-CCNC: 73 U/L — SIGNIFICANT CHANGE UP (ref 30–115)
ALT FLD-CCNC: 43 U/L — HIGH (ref 0–41)
ANION GAP SERPL CALC-SCNC: 11 MMOL/L — SIGNIFICANT CHANGE UP (ref 7–14)
AST SERPL-CCNC: 26 U/L — SIGNIFICANT CHANGE UP (ref 0–41)
BASOPHILS # BLD AUTO: 0.03 K/UL — SIGNIFICANT CHANGE UP (ref 0–0.2)
BASOPHILS NFR BLD AUTO: 0.4 % — SIGNIFICANT CHANGE UP (ref 0–1)
BILIRUB SERPL-MCNC: 0.3 MG/DL — SIGNIFICANT CHANGE UP (ref 0.2–1.2)
BUN SERPL-MCNC: 14 MG/DL — SIGNIFICANT CHANGE UP (ref 10–20)
CALCIUM SERPL-MCNC: 9 MG/DL — SIGNIFICANT CHANGE UP (ref 8.4–10.5)
CHLORIDE SERPL-SCNC: 98 MMOL/L — SIGNIFICANT CHANGE UP (ref 98–110)
CO2 SERPL-SCNC: 26 MMOL/L — SIGNIFICANT CHANGE UP (ref 17–32)
CREAT SERPL-MCNC: 0.9 MG/DL — SIGNIFICANT CHANGE UP (ref 0.7–1.5)
EGFR: 95 ML/MIN/1.73M2 — SIGNIFICANT CHANGE UP
EOSINOPHIL # BLD AUTO: 0.1 K/UL — SIGNIFICANT CHANGE UP (ref 0–0.7)
EOSINOPHIL NFR BLD AUTO: 1.2 % — SIGNIFICANT CHANGE UP (ref 0–8)
GLUCOSE BLDC GLUCOMTR-MCNC: 239 MG/DL — HIGH (ref 70–99)
GLUCOSE BLDC GLUCOMTR-MCNC: 255 MG/DL — HIGH (ref 70–99)
GLUCOSE SERPL-MCNC: 240 MG/DL — HIGH (ref 70–99)
HCT VFR BLD CALC: 31.3 % — LOW (ref 42–52)
HGB BLD-MCNC: 10.1 G/DL — LOW (ref 14–18)
IMM GRANULOCYTES NFR BLD AUTO: 1.2 % — HIGH (ref 0.1–0.3)
LYMPHOCYTES # BLD AUTO: 1.81 K/UL — SIGNIFICANT CHANGE UP (ref 1.2–3.4)
LYMPHOCYTES # BLD AUTO: 22.6 % — SIGNIFICANT CHANGE UP (ref 20.5–51.1)
MAGNESIUM SERPL-MCNC: 1.9 MG/DL — SIGNIFICANT CHANGE UP (ref 1.8–2.4)
MCHC RBC-ENTMCNC: 27.4 PG — SIGNIFICANT CHANGE UP (ref 27–31)
MCHC RBC-ENTMCNC: 32.3 G/DL — SIGNIFICANT CHANGE UP (ref 32–37)
MCV RBC AUTO: 84.8 FL — SIGNIFICANT CHANGE UP (ref 80–94)
MONOCYTES # BLD AUTO: 0.61 K/UL — HIGH (ref 0.1–0.6)
MONOCYTES NFR BLD AUTO: 7.6 % — SIGNIFICANT CHANGE UP (ref 1.7–9.3)
NEUTROPHILS # BLD AUTO: 5.37 K/UL — SIGNIFICANT CHANGE UP (ref 1.4–6.5)
NEUTROPHILS NFR BLD AUTO: 67 % — SIGNIFICANT CHANGE UP (ref 42.2–75.2)
NRBC # BLD: 0 /100 WBCS — SIGNIFICANT CHANGE UP (ref 0–0)
PLATELET # BLD AUTO: 461 K/UL — HIGH (ref 130–400)
PMV BLD: 10.4 FL — SIGNIFICANT CHANGE UP (ref 7.4–10.4)
POTASSIUM SERPL-MCNC: 4.5 MMOL/L — SIGNIFICANT CHANGE UP (ref 3.5–5)
POTASSIUM SERPL-SCNC: 4.5 MMOL/L — SIGNIFICANT CHANGE UP (ref 3.5–5)
PROT SERPL-MCNC: 6.1 G/DL — SIGNIFICANT CHANGE UP (ref 6–8)
RBC # BLD: 3.69 M/UL — LOW (ref 4.7–6.1)
RBC # FLD: 13.4 % — SIGNIFICANT CHANGE UP (ref 11.5–14.5)
SODIUM SERPL-SCNC: 135 MMOL/L — SIGNIFICANT CHANGE UP (ref 135–146)
WBC # BLD: 8.02 K/UL — SIGNIFICANT CHANGE UP (ref 4.8–10.8)
WBC # FLD AUTO: 8.02 K/UL — SIGNIFICANT CHANGE UP (ref 4.8–10.8)

## 2023-08-11 PROCEDURE — 99239 HOSP IP/OBS DSCHRG MGMT >30: CPT

## 2023-08-11 RX ORDER — ISOPROPYL ALCOHOL, BENZOCAINE .7; .06 ML/ML; ML/ML
0 SWAB TOPICAL
Qty: 100 | Refills: 1
Start: 2023-08-11

## 2023-08-11 RX ORDER — CEFPODOXIME PROXETIL 100 MG
1 TABLET ORAL
Qty: 22 | Refills: 0
Start: 2023-08-11 | End: 2023-08-21

## 2023-08-11 RX ORDER — POLYETHYLENE GLYCOL 3350 17 G/17G
17 POWDER, FOR SOLUTION ORAL ONCE
Refills: 0 | Status: COMPLETED | OUTPATIENT
Start: 2023-08-11 | End: 2023-08-11

## 2023-08-11 RX ORDER — INSULIN GLARGINE 100 [IU]/ML
0 INJECTION, SOLUTION SUBCUTANEOUS
Refills: 0 | DISCHARGE

## 2023-08-11 RX ORDER — INSULIN GLARGINE 100 [IU]/ML
36 INJECTION, SOLUTION SUBCUTANEOUS
Qty: 30 | Refills: 0
Start: 2023-08-11

## 2023-08-11 RX ORDER — GLUCAGON INJECTION, SOLUTION 0.5 MG/.1ML
0.5 INJECTION, SOLUTION SUBCUTANEOUS
Qty: 5 | Refills: 0
Start: 2023-08-11

## 2023-08-11 RX ORDER — INSULIN LISPRO 100/ML
12 VIAL (ML) SUBCUTANEOUS
Qty: 30 | Refills: 0
Start: 2023-08-11

## 2023-08-11 RX ADMIN — POLYETHYLENE GLYCOL 3350 17 GRAM(S): 17 POWDER, FOR SOLUTION ORAL at 00:17

## 2023-08-11 RX ADMIN — ENOXAPARIN SODIUM 40 MILLIGRAM(S): 100 INJECTION SUBCUTANEOUS at 11:13

## 2023-08-11 RX ADMIN — Medication 2: at 08:37

## 2023-08-11 RX ADMIN — Medication 5 MILLIGRAM(S): at 05:07

## 2023-08-11 RX ADMIN — MEROPENEM 100 MILLIGRAM(S): 1 INJECTION INTRAVENOUS at 05:08

## 2023-08-11 RX ADMIN — Medication 12 UNIT(S): at 08:37

## 2023-08-11 RX ADMIN — Medication 3: at 12:50

## 2023-08-11 RX ADMIN — Medication 12 UNIT(S): at 12:50

## 2023-08-11 RX ADMIN — CHLORHEXIDINE GLUCONATE 1 APPLICATION(S): 213 SOLUTION TOPICAL at 05:08

## 2023-08-11 NOTE — DISCHARGE NOTE PROVIDER - HOSPITAL COURSE
A 63 Y/M with Pmhx of HTN, HLD, DM, papillary urothelial bladder cancer s/p transurethral resection(6/2022) and BCG treatment(last cycle on 7/27/2023) presented to ED with the complains of burning urination with increased frequency. The patient went to visit his PCP (8 days back) with these complains and was found to have UTI and was started on Ciprofloxacin. Prior to admission the patient had fever of 101 F and went to follow up with his PCP and was found to have increased serum glucose level, therefore was sent to ED. No abdominal pain, vomiting, headache, SOB, chest pain, dizziness or LOC. Patient admitted to CCU on 8/8 as a case of ALVARO + DKA. Patient started on insulin drip and eventually switched to sq insulin as the anion gap closed and patient resumed feeding. Patient downgraded to 3A on 8/9    #Mild DKA  - Acute lactic acidosis likely 2/2 metformin in the setting of renal impairment   - Prerenal azotemia now resolved  - Completed insulin gtt protocol for DKA  - HgbA1c 9.6  - Patient was on insulin drip, now receiving sq insulin   - Patient was receiving LR, but this was stopped as it is no longer needed   - F/u Endocrine consult     #Acute pyelonephritis with no obstructive uropathy   - 8/8 Urine cultures negative   - KUB 8/9 showing layering debris in the bladder, otherwise unremarkable   - Blood cultures NGTD  - Continue meropenem 1g IV q8hrs per ID   - According to ID, could change to PO vantin on 8/11 200 mg q12h till 8/22    #Papillary urothelial bladder cancer s/p transurethral resection (6/2022) and BCG treatment (last cycle on 7/27/2023)  - Patient to follow up outpatient upon discharge to continue BCG treatment     #DM  - Increased lantus to 36 at bedtime  - Lispro 12 TID before meals   - Monitor sugars     #HTN  - Continue enalapril 5mg daily    #Pseudohyponatremia - resolved    A 63 Y/M with Pmhx of HTN, HLD, DM, papillary urothelial bladder cancer s/p transurethral resection(6/2022) and BCG treatment(last cycle on 7/27/2023) presented to ED with the complains of burning urination with increased frequency. The patient went to visit his PCP (8 days back) with these complains and was found to have UTI and was started on Ciprofloxacin. Prior to admission the patient had fever of 101 F and went to follow up with his PCP and was found to have increased serum glucose level, therefore was sent to ED. No abdominal pain, vomiting, headache, SOB, chest pain, dizziness or LOC. Patient admitted to CCU on 8/8 as a case of ALVARO + DKA. Patient started on insulin drip and eventually switched to sq insulin as the anion gap closed and patient resumed feeding. Patient downgraded to 3A on 8/9    #Mild DKA  - Acute lactic acidosis likely 2/2 metformin in the setting of renal impairment   - Prerenal azotemia now resolved  - Completed insulin gtt protocol for DKA  - HgbA1c 9.6  - Patient was on insulin drip, now receiving sq insulin   - Patient was receiving LR, but this was stopped as it is no longer needed   - Per Endocrine, keep patient on Lantus 36 and Lispro 12/12/12 on discharge.   - Discontinue metformin on discharge to eliminate risk for lactic acidosis in case patient presents with [facility] due to history of bladder cancer     #Acute pyelonephritis with no obstructive uropathy   - 8/8 Urine cultures negative   - KUB 8/9 showing layering debris in the bladder, otherwise unremarkable   - Blood cultures NGTD  - Patient treated with meropenem 1g IV q8hrs per ID   - According to ID, change to PO vantin on 8/11 200 mg q12h till 8/22    #Papillary urothelial bladder cancer s/p transurethral resection (6/2022) and BCG treatment (last cycle on 7/27/2023)  - Patient to follow up outpatient upon discharge to continue BCG treatment     #DM  - Increased lantus to 36 at bedtime  - Lispro 12 TID before meals   - Monitor sugars     #HTN  - Continue enalapril 5mg daily    #Pseudohyponatremia - resolved    A 63 Y/M with Pmhx of HTN, HLD, DM, papillary urothelial bladder cancer s/p transurethral resection(6/2022) and BCG treatment(last cycle on 7/27/2023) presented to ED with the complains of burning urination with increased frequency. The patient went to visit his PCP (8 days back) with these complains and was found to have UTI and was started on Ciprofloxacin. Prior to admission the patient had fever of 101 F and went to follow up with his PCP and was found to have increased serum glucose level, therefore was sent to ED. No abdominal pain, vomiting, headache, SOB, chest pain, dizziness or LOC. Patient admitted to CCU on 8/8 as a case of ALVARO + DKA. Patient started on insulin drip and eventually switched to sq insulin as the anion gap closed and patient resumed feeding. Patient downgraded to 3A on 8/9    #Mild DKA  - Acute lactic acidosis likely 2/2 metformin in the setting of renal impairment   - Prerenal azotemia now resolved  - Completed insulin gtt protocol for DKA  - HgbA1c 9.6  - Patient was on insulin drip, now receiving sq insulin   - Patient was receiving LR, but this was stopped as it is no longer needed   - Per Endocrine, keep patient on Lantus 36 and Lispro 12/12/12 on discharge.   - Discontinue metformin on discharge to eliminate risk for lactic acidosis in case patient presents with [facility] due to history of bladder cancer     #Acute pyelonephritis with no obstructive uropathy   - 8/8 Urine cultures negative   - KUB 8/9 showing layering debris in the bladder, otherwise unremarkable   - Blood cultures NGTD  - Patient treated with meropenem 1g IV q8hrs per ID   - According to ID, change to PO vantin on 8/11 200 mg q12h till 8/22 to complete on discharge     #Papillary urothelial bladder cancer s/p transurethral resection (6/2022) and BCG treatment (last cycle on 7/27/2023)  - Patient to follow up outpatient upon discharge to continue BCG treatment     #DM  - Increased Lantus to 36 at bedtime  - Lispro 12 TID before meals   - Monitor sugars     #HTN  - Continue enalapril 5mg daily    #Pseudohyponatremia - resolved    A 63 Y/M with Pmhx of HTN, HLD, DM, papillary urothelial bladder cancer s/p transurethral resection(6/2022) and BCG treatment(last cycle on 7/27/2023) presented to ED with the complains of burning urination with increased frequency. The patient went to visit his PCP (8 days back) with these complains and was found to have UTI and was started on Ciprofloxacin. Prior to admission the patient had fever of 101 F and went to follow up with his PCP and was found to have increased serum glucose level, therefore was sent to ED. No abdominal pain, vomiting, headache, SOB, chest pain, dizziness or LOC. Patient admitted to CCU on 8/8 as a case of ALVARO + DKA. Patient started on insulin drip and eventually switched to sq insulin as the anion gap closed and patient resumed feeding. Patient downgraded to 3A on 8/9    #Mild DKA  - Acute lactic acidosis likely 2/2 metformin in the setting of renal impairment   - Prerenal azotemia now resolved  - Completed insulin gtt protocol for DKA  - HgbA1c 9.6  - Patient was on insulin drip, now receiving sq insulin   - Patient was receiving LR, but this was stopped as it is no longer needed   - Per Endocrine, keep patient on Lantus 36 and Lispro 12/12/12 on discharge.   - Discontinue metformin on discharge to eliminate risk for lactic acidosis in case patient presents with [facility] due to history of bladder cancer     #Acute pyelonephritis with no obstructive uropathy   - 8/8 Urine cultures negative   - KUB 8/9 showing layering debris in the bladder, otherwise unremarkable   - Blood cultures NGTD  - Patient treated with meropenem 1g IV q8hrs per ID   - According to ID, change to PO vantin on 8/11 200 mg q12h till 8/22 to complete on discharge     #Papillary urothelial bladder cancer s/p transurethral resection (6/2022) and BCG treatment (last cycle on 7/27/2023)  - Patient to follow up outpatient upon discharge to continue BCG treatment     #DM  - Increased Lantus to 36 at bedtime  - Lispro 12 TID before meals   - Monitor sugars     #HTN  - Continue enalapril 5mg daily    #Pseudohyponatremia - resolved     Attending Note:  Patient seen and examined independently. I personally had a face-to-face encounter with the patient, examined the patient myself, personally reviewed labs & Radiology images,  and reviewed the plan of care with the housestaff. Agree with resident's note but my note supersedes that of the resident in the matters hereby listed.   D/c to home.

## 2023-08-11 NOTE — DISCHARGE NOTE PROVIDER - NSDCCPCAREPLAN_GEN_ALL_CORE_FT
PRINCIPAL DISCHARGE DIAGNOSIS  Diagnosis: DKA (diabetic ketoacidosis)  Assessment and Plan of Treatment: You came to the hospital with complaints of burning on urination and increased urinary frequency. You were diagnosed with a mild diabetic ketoacidosis, treated with an insulin drip and later subcutaneous insulin, and your glucose level was closely monitored. Your HgbA1c is 9.6. We changed your insulin to Lantus 36 units at bedtime and Lispro 12 units three times a day before meals. Please follow up with Dr. Lake outpatient for diabetes management (their office will call you to make appointment, but please call yourself to make an appointment if you have not heard from them in a few days). Also, please follow up outpatient with your urologist for management of your bladder cancer. Do not resume metformin at home because we want to avoid lactic acidosis in the future.   You were also treated for UTI here with ceftriaxone and meropenem. We prescribed Vantin for you to complete your antibiotic course until 8/22. Please follow up with your PCP, Urologist and Dr. Lake outpatient and please return to the hospital if you have any recurrent symptoms of urinary pain or increased urinary frequency.      SECONDARY DISCHARGE DIAGNOSES  Diagnosis: Complicated UTI (urinary tract infection)  Assessment and Plan of Treatment:     Diagnosis: Bladder cancer  Assessment and Plan of Treatment:      PRINCIPAL DISCHARGE DIAGNOSIS  Diagnosis: DKA (diabetic ketoacidosis)  Assessment and Plan of Treatment: You came to the hospital with complaints of burning on urination and increased urinary frequency. You were diagnosed with a mild diabetic ketoacidosis, treated with an insulin drip and later subcutaneous insulin, and your glucose level was closely monitored. Your HgbA1c is 9.6. We changed your insulin to Lantus 36 units at bedtime and Lispro 12 units three times a day before meals. Please follow up with Dr. Lake outpatient for diabetes management (their office will call you to make appointment, but please call yourself to make an appointment if you have not heard from them in a few days). Also, please follow up outpatient with your urologist for management of your bladder cancer. Do not resume metformin at home because we want to avoid lactic acidosis in the future.   You were also treated for UTI here with ceftriaxone and meropenem. We prescribed Vantin for you to complete your antibiotic course until 8/22. Please follow up with your PCP, Urologist and Dr. Lake outpatient and please return to the hospital if you have any recurrent symptoms of urinary pain or increased urinary frequency.  f/u with your urologist to see when next BCG administration can be done.      SECONDARY DISCHARGE DIAGNOSES  Diagnosis: Complicated UTI (urinary tract infection)  Assessment and Plan of Treatment:     Diagnosis: Bladder cancer  Assessment and Plan of Treatment:

## 2023-08-11 NOTE — DISCHARGE NOTE PROVIDER - NSDCMRMEDTOKEN_GEN_ALL_CORE_FT
Flip KwikPen 100 units/mL subcutaneous solution: subcutaneous  enalapril 5 mg oral tablet: 1 tab(s) orally once a day  Kendy Moreland Pen 100 units/mL subcutaneous solution: subcutaneous  Pyridium Plus oral tablet: orally   alcohol swabs: Apply topically to affected area 4 times a day  cefpodoxime 200 mg oral tablet: 1 tab(s) orally 2 times a day  enalapril 5 mg oral tablet: 1 tab(s) orally once a day  Freestyle Radha 2 Meridian: Dispense 1 Meridian  Freestyle Radha 2 sensors: Apply 1 sensor every 14 days  Freestyle Precision Narendra Strips: Test blood sugar four times a day when you see check blood glucose symbol or when symptoms don&#x27;t match Radha reading.  Glucagon Emergency Kit for Low Blood Sugar 1 mg injection: 0.5 milligram(s) intravenously once as needed for low blood sugar  glucometer (per patient&#x27;s insurance): Test blood sugars four times a day. Dispense #1 glucometer.  glucose tablets: Follow instructions on bottle when sugar is low.  HumaLOG 100 units/mL injectable solution: 12 unit(s) subcutaneous 3 times a day before meals  Insulin Pen Needles, 4mm: 1 application subcutaneously 4 times a day. ** Use with insulin pen **  lancets: 1 application subcutaneously 4 times a day  Lantus 100 units/mL subcutaneous solution: 36 unit(s) subcutaneous once a day (at bedtime)  Pyridium Plus oral tablet: orally  test strips (per patient&#x27;s insurance): 1 application subcutaneously 4 times a day. ** Compatible with patient&#x27;s glucometer **  U-100 Insulin Syringe, 1 mL: 1 application subcutaneously 2 times a day ** 1 mL holds up to 100 units of insulin **  U-100 Insulin Syringe, 1/2 mL: 1 application subcutaneously 2 times a day ** 1/2 mL holds up to 50 units of insulin **  U-100 Insulin Syringe, 3/10 mL: 1 application subcutaneously 2 times a day ** 3/10 mL holds up to 30 units of insulin **  Urine ketone strips: Use strip mid-urine stream as needed to assess for ketones. Call medical provider or 911 if moderate to high ketones are present.

## 2023-08-11 NOTE — DISCHARGE NOTE PROVIDER - CARE PROVIDER_API CALL
Jovan Lake  Endocrinology/Metab/Diabetes  [address] [place] [address]  Phone: [phone]  Fax: [phone]  Follow Up Time: 2 weeks    Paulo Mohamud  [address] [address]  Phone: [phone]  Fax: [phone]  Follow Up Time: 1 week   Jovan Lake  Endocrinology/Metab/Diabetes  [address] [place] [address]  Phone: [phone]  Fax: [phone]  Follow Up Time: 2 weeks    Paulo Mohamud  [address] [address]  Phone: [phone]  Fax: [phone]  Follow Up Time: 1 week    Jose Null  Urology  [facility] [address] [address]  Phone: [phone]  Fax: [phone]  Follow Up Time: 1-3 days

## 2023-08-11 NOTE — DISCHARGE NOTE NURSING/CASE MANAGEMENT/SOCIAL WORK - PATIENT PORTAL LINK FT
You can access the FollowMyHealth Patient Portal offered by [facility] by registering at the following website: [email or web address]. By joining [facility]’s FollowMyHealth portal, you will also be able to view your health information using other applications (apps) compatible with our system.

## 2023-08-11 NOTE — DISCHARGE NOTE NURSING/CASE MANAGEMENT/SOCIAL WORK - NSDCPEFALRISK_GEN_ALL_CORE
For information on Fall & Injury Prevention, visit: [email or web address]-[email or web address]-mobility OR  [email or web address]-injury OR  https://www.cdc.gov/steadi/patient.html

## 2023-08-11 NOTE — DISCHARGE NOTE PROVIDER - CARE PROVIDERS DIRECT ADDRESSES
,[email or web address],DirectAddress_Unknown [email or web address],DirectAddress_Unknown,DirectAddress_Unknown

## 2023-08-11 NOTE — DISCHARGE NOTE PROVIDER - PROVIDER TOKENS
PROVIDER:[TOKEN:[[id number]],FOLLOWUP:[2 weeks]],PROVIDER:[TOKEN:[[id number]],FOLLOWUP:[1 week]] PROVIDER:[TOKEN:[[id number]],FOLLOWUP:[2 weeks]],PROVIDER:[TOKEN:[[id number]],FOLLOWUP:[1 week]],PROVIDER:[TOKEN:[[id number]],FOLLOWUP:[1-3 days]]

## 2023-08-13 LAB
CULTURE RESULTS: SIGNIFICANT CHANGE UP
SPECIMEN SOURCE: SIGNIFICANT CHANGE UP

## 2023-08-14 LAB
CULTURE RESULTS: SIGNIFICANT CHANGE UP
SPECIMEN SOURCE: SIGNIFICANT CHANGE UP

## 2023-08-15 DIAGNOSIS — E87.1 HYPO-OSMOLALITY AND HYPONATREMIA: ICD-10-CM

## 2023-08-15 DIAGNOSIS — E78.5 HYPERLIPIDEMIA, UNSPECIFIED: ICD-10-CM

## 2023-08-15 DIAGNOSIS — Z79.4 LONG TERM (CURRENT) USE OF INSULIN: ICD-10-CM

## 2023-08-15 DIAGNOSIS — E11.10 TYPE 2 DIABETES MELLITUS WITH KETOACIDOSIS WITHOUT COMA: ICD-10-CM

## 2023-08-15 DIAGNOSIS — N10 ACUTE PYELONEPHRITIS: ICD-10-CM

## 2023-08-15 DIAGNOSIS — I10 ESSENTIAL (PRIMARY) HYPERTENSION: ICD-10-CM
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
## 2023-08-15 DIAGNOSIS — T38.3X5A ADVERSE EFFECT OF INSULIN AND ORAL HYPOGLYCEMIC [ANTIDIABETIC] DRUGS, INITIAL ENCOUNTER: ICD-10-CM

## 2023-08-15 DIAGNOSIS — Z85.51 PERSONAL HISTORY OF MALIGNANT NEOPLASM OF BLADDER: ICD-10-CM

## 2023-08-15 DIAGNOSIS — E11.65 TYPE 2 DIABETES MELLITUS WITH HYPERGLYCEMIA: ICD-10-CM

## 2023-08-25 PROBLEM — C67.9 MALIGNANT NEOPLASM OF BLADDER, UNSPECIFIED: Chronic | Status: ACTIVE | Noted: 2023-08-08

## 2023-08-28 ENCOUNTER — APPOINTMENT (OUTPATIENT)
Dept: CARDIOLOGY | Facility: CLINIC | Age: 63
End: 2023-08-28
Payer: COMMERCIAL

## 2023-08-28 VITALS
SYSTOLIC BLOOD PRESSURE: 138 MMHG | WEIGHT: 225 LBS | HEART RATE: 71 BPM | DIASTOLIC BLOOD PRESSURE: 70 MMHG | BODY MASS INDEX: 34.1 KG/M2 | HEIGHT: 68 IN

## 2023-08-28 PROCEDURE — 99204 OFFICE O/P NEW MOD 45 MIN: CPT | Mod: 25

## 2023-08-28 PROCEDURE — 93000 ELECTROCARDIOGRAM COMPLETE: CPT

## 2023-09-02 NOTE — DISCUSSION/SUMMARY
[FreeTextEntry1] : ECHO to evaluate for structural heart disease. Pharm NST to evaluate for CAD / anginal equivalent. Cont Enalapril Follow-up 6-weeks [EKG obtained to assist in diagnosis and management of assessed problem(s)] : EKG obtained to assist in diagnosis and management of assessed problem(s)

## 2023-09-02 NOTE — PHYSICAL EXAM
[de-identified] : well appearing, overweight, no distress [de-identified] : reg, nL s1/s2, no m/r/g [de-identified] : CTA [de-identified] : no edema [de-identified] : alert, normal affect, logical conversation

## 2023-09-02 NOTE — HISTORY OF PRESENT ILLNESS
[FreeTextEntry1] : 63 year-old male presents for evaluation.  No cardiac history. Risk factors include DM, HTN, and HLD.  Recent hospitalization for pyelonephritis complicated by DKA  Exertional dyspnea (stairs / brisk pace).  Breathing comfortable at rest.  No chest pain.  No palpitations, lightheadedness, syncope.   PMH / PSH: DM HTN HLD Pancreatic IPMN Bladder cancer s/p TURBP / BCG  Appendectomy Tonsillectomy Cholecystectomy  SOCIAL: Nonsmoker

## 2023-09-21 ENCOUNTER — OUTPATIENT (OUTPATIENT)
Dept: OUTPATIENT SERVICES | Facility: HOSPITAL | Age: 63
LOS: 1 days | End: 2023-09-21
Payer: COMMERCIAL

## 2023-09-21 DIAGNOSIS — Z90.49 ACQUIRED ABSENCE OF OTHER SPECIFIED PARTS OF DIGESTIVE TRACT: Chronic | ICD-10-CM

## 2023-09-21 DIAGNOSIS — R06.09 OTHER FORMS OF DYSPNEA: ICD-10-CM

## 2023-09-21 DIAGNOSIS — Z00.8 ENCOUNTER FOR OTHER GENERAL EXAMINATION: ICD-10-CM

## 2023-09-21 DIAGNOSIS — Z90.89 ACQUIRED ABSENCE OF OTHER ORGANS: Chronic | ICD-10-CM

## 2023-09-21 DIAGNOSIS — Z98.49 CATARACT EXTRACTION STATUS, UNSPECIFIED EYE: Chronic | ICD-10-CM

## 2023-09-21 PROCEDURE — 93018 CV STRESS TEST I&R ONLY: CPT

## 2023-09-21 PROCEDURE — 78452 HT MUSCLE IMAGE SPECT MULT: CPT | Mod: 26

## 2023-09-21 PROCEDURE — 78452 HT MUSCLE IMAGE SPECT MULT: CPT

## 2023-09-21 PROCEDURE — A9500: CPT

## 2023-09-22 DIAGNOSIS — R06.09 OTHER FORMS OF DYSPNEA: ICD-10-CM

## 2023-10-06 ENCOUNTER — APPOINTMENT (OUTPATIENT)
Dept: CARDIOLOGY | Facility: CLINIC | Age: 63
End: 2023-10-06
Payer: COMMERCIAL

## 2023-10-06 PROCEDURE — 93306 TTE W/DOPPLER COMPLETE: CPT

## 2023-10-13 ENCOUNTER — APPOINTMENT (OUTPATIENT)
Dept: CARDIOLOGY | Facility: CLINIC | Age: 63
End: 2023-10-13
Payer: COMMERCIAL

## 2023-10-13 VITALS
DIASTOLIC BLOOD PRESSURE: 70 MMHG | HEIGHT: 68 IN | WEIGHT: 226 LBS | SYSTOLIC BLOOD PRESSURE: 110 MMHG | BODY MASS INDEX: 34.25 KG/M2 | OXYGEN SATURATION: 98 % | HEART RATE: 83 BPM

## 2023-10-13 PROCEDURE — 93000 ELECTROCARDIOGRAM COMPLETE: CPT

## 2023-10-13 PROCEDURE — 99214 OFFICE O/P EST MOD 30 MIN: CPT | Mod: 25

## 2023-12-13 NOTE — PATIENT PROFILE ADULT - NSPROHMSYMPCOND_GEN_A_NUR
-- DO NOT REPLY / DO NOT REPLY ALL --  -- Message is from Engagement Center Operations (ECO) --    General Patient Message: Wife is calling in to see about the refill request , please call back. He is out of his med.    Caller Information         Type Contact Phone/Fax    12/13/2023 10:00 AM CST Phone (Incoming) SAMMI PATTEN (Emergency Contact) [phone]          Alternative phone number: N    Can a detailed message be left? Yes    Message Turnaround: [place]:    Refer to site's KB page for routing instructions    Please give this turnaround time to the caller:   \"You can expect to receive a response 1-3 business days after your provider's clinical team reviews the message\"               cancer/diabetes

## 2024-04-09 ENCOUNTER — OUTPATIENT (OUTPATIENT)
Dept: OUTPATIENT SERVICES | Facility: HOSPITAL | Age: 64
LOS: 1 days | End: 2024-04-09
Payer: COMMERCIAL

## 2024-04-09 DIAGNOSIS — Z90.49 ACQUIRED ABSENCE OF OTHER SPECIFIED PARTS OF DIGESTIVE TRACT: Chronic | ICD-10-CM

## 2024-04-09 DIAGNOSIS — M54.16 RADICULOPATHY, LUMBAR REGION: ICD-10-CM

## 2024-04-09 DIAGNOSIS — Z98.49 CATARACT EXTRACTION STATUS, UNSPECIFIED EYE: Chronic | ICD-10-CM

## 2024-04-09 DIAGNOSIS — Z90.89 ACQUIRED ABSENCE OF OTHER ORGANS: Chronic | ICD-10-CM

## 2024-04-09 PROCEDURE — 72110 X-RAY EXAM L-2 SPINE 4/>VWS: CPT | Mod: 26

## 2024-04-09 PROCEDURE — 72110 X-RAY EXAM L-2 SPINE 4/>VWS: CPT

## 2024-04-10 DIAGNOSIS — M54.16 RADICULOPATHY, LUMBAR REGION: ICD-10-CM

## 2024-04-12 ENCOUNTER — APPOINTMENT (OUTPATIENT)
Dept: CARDIOLOGY | Facility: CLINIC | Age: 64
End: 2024-04-12

## 2024-04-22 ENCOUNTER — OUTPATIENT (OUTPATIENT)
Dept: OUTPATIENT SERVICES | Facility: HOSPITAL | Age: 64
LOS: 1 days | End: 2024-04-22
Payer: COMMERCIAL

## 2024-04-22 VITALS
RESPIRATION RATE: 18 BRPM | WEIGHT: 233.03 LBS | OXYGEN SATURATION: 97 % | DIASTOLIC BLOOD PRESSURE: 65 MMHG | SYSTOLIC BLOOD PRESSURE: 144 MMHG | TEMPERATURE: 98 F | HEART RATE: 73 BPM | HEIGHT: 68 IN

## 2024-04-22 DIAGNOSIS — Z90.89 ACQUIRED ABSENCE OF OTHER ORGANS: Chronic | ICD-10-CM

## 2024-04-22 DIAGNOSIS — Z98.49 CATARACT EXTRACTION STATUS, UNSPECIFIED EYE: Chronic | ICD-10-CM

## 2024-04-22 DIAGNOSIS — C67.2 MALIGNANT NEOPLASM OF LATERAL WALL OF BLADDER: ICD-10-CM

## 2024-04-22 DIAGNOSIS — Z01.818 ENCOUNTER FOR OTHER PREPROCEDURAL EXAMINATION: ICD-10-CM

## 2024-04-22 DIAGNOSIS — Z90.49 ACQUIRED ABSENCE OF OTHER SPECIFIED PARTS OF DIGESTIVE TRACT: Chronic | ICD-10-CM

## 2024-04-22 LAB
ALBUMIN SERPL ELPH-MCNC: 4.7 G/DL — SIGNIFICANT CHANGE UP (ref 3.5–5.2)
ALP SERPL-CCNC: 88 U/L — SIGNIFICANT CHANGE UP (ref 30–115)
ALT FLD-CCNC: 21 U/L — SIGNIFICANT CHANGE UP (ref 0–41)
ANION GAP SERPL CALC-SCNC: 12 MMOL/L — SIGNIFICANT CHANGE UP (ref 7–14)
APPEARANCE UR: CLEAR — SIGNIFICANT CHANGE UP
AST SERPL-CCNC: 18 U/L — SIGNIFICANT CHANGE UP (ref 0–41)
BASOPHILS # BLD AUTO: 0.04 K/UL — SIGNIFICANT CHANGE UP (ref 0–0.2)
BASOPHILS NFR BLD AUTO: 0.5 % — SIGNIFICANT CHANGE UP (ref 0–1)
BILIRUB SERPL-MCNC: 0.3 MG/DL — SIGNIFICANT CHANGE UP (ref 0.2–1.2)
BILIRUB UR-MCNC: NEGATIVE — SIGNIFICANT CHANGE UP
BUN SERPL-MCNC: 25 MG/DL — HIGH (ref 10–20)
CALCIUM SERPL-MCNC: 9.7 MG/DL — SIGNIFICANT CHANGE UP (ref 8.4–10.5)
CHLORIDE SERPL-SCNC: 102 MMOL/L — SIGNIFICANT CHANGE UP (ref 98–110)
CO2 SERPL-SCNC: 26 MMOL/L — SIGNIFICANT CHANGE UP (ref 17–32)
COLOR SPEC: YELLOW — SIGNIFICANT CHANGE UP
CREAT SERPL-MCNC: 1.3 MG/DL — SIGNIFICANT CHANGE UP (ref 0.7–1.5)
DIFF PNL FLD: NEGATIVE — SIGNIFICANT CHANGE UP
EGFR: 61 ML/MIN/1.73M2 — SIGNIFICANT CHANGE UP
EOSINOPHIL # BLD AUTO: 0.18 K/UL — SIGNIFICANT CHANGE UP (ref 0–0.7)
EOSINOPHIL NFR BLD AUTO: 2.3 % — SIGNIFICANT CHANGE UP (ref 0–8)
GLUCOSE SERPL-MCNC: 107 MG/DL — HIGH (ref 70–99)
GLUCOSE UR QL: NEGATIVE MG/DL — SIGNIFICANT CHANGE UP
HCT VFR BLD CALC: 41.4 % — LOW (ref 42–52)
HGB BLD-MCNC: 13.6 G/DL — LOW (ref 14–18)
IMM GRANULOCYTES NFR BLD AUTO: 0.3 % — SIGNIFICANT CHANGE UP (ref 0.1–0.3)
KETONES UR-MCNC: ABNORMAL MG/DL
LEUKOCYTE ESTERASE UR-ACNC: NEGATIVE — SIGNIFICANT CHANGE UP
LYMPHOCYTES # BLD AUTO: 2.99 K/UL — SIGNIFICANT CHANGE UP (ref 1.2–3.4)
LYMPHOCYTES # BLD AUTO: 38.5 % — SIGNIFICANT CHANGE UP (ref 20.5–51.1)
MCHC RBC-ENTMCNC: 28.8 PG — SIGNIFICANT CHANGE UP (ref 27–31)
MCHC RBC-ENTMCNC: 32.9 G/DL — SIGNIFICANT CHANGE UP (ref 32–37)
MCV RBC AUTO: 87.5 FL — SIGNIFICANT CHANGE UP (ref 80–94)
MONOCYTES # BLD AUTO: 0.6 K/UL — SIGNIFICANT CHANGE UP (ref 0.1–0.6)
MONOCYTES NFR BLD AUTO: 7.7 % — SIGNIFICANT CHANGE UP (ref 1.7–9.3)
NEUTROPHILS # BLD AUTO: 3.93 K/UL — SIGNIFICANT CHANGE UP (ref 1.4–6.5)
NEUTROPHILS NFR BLD AUTO: 50.7 % — SIGNIFICANT CHANGE UP (ref 42.2–75.2)
NITRITE UR-MCNC: NEGATIVE — SIGNIFICANT CHANGE UP
NRBC # BLD: 0 /100 WBCS — SIGNIFICANT CHANGE UP (ref 0–0)
PH UR: 5.5 — SIGNIFICANT CHANGE UP (ref 5–8)
PLATELET # BLD AUTO: 378 K/UL — SIGNIFICANT CHANGE UP (ref 130–400)
PMV BLD: 9.8 FL — SIGNIFICANT CHANGE UP (ref 7.4–10.4)
POTASSIUM SERPL-MCNC: 4.2 MMOL/L — SIGNIFICANT CHANGE UP (ref 3.5–5)
POTASSIUM SERPL-SCNC: 4.2 MMOL/L — SIGNIFICANT CHANGE UP (ref 3.5–5)
PROT SERPL-MCNC: 7.5 G/DL — SIGNIFICANT CHANGE UP (ref 6–8)
PROT UR-MCNC: NEGATIVE MG/DL — SIGNIFICANT CHANGE UP
RBC # BLD: 4.73 M/UL — SIGNIFICANT CHANGE UP (ref 4.7–6.1)
RBC # FLD: 12.7 % — SIGNIFICANT CHANGE UP (ref 11.5–14.5)
SODIUM SERPL-SCNC: 140 MMOL/L — SIGNIFICANT CHANGE UP (ref 135–146)
SP GR SPEC: 1.02 — SIGNIFICANT CHANGE UP (ref 1–1.03)
UROBILINOGEN FLD QL: 0.2 MG/DL — SIGNIFICANT CHANGE UP (ref 0.2–1)
WBC # BLD: 7.76 K/UL — SIGNIFICANT CHANGE UP (ref 4.8–10.8)
WBC # FLD AUTO: 7.76 K/UL — SIGNIFICANT CHANGE UP (ref 4.8–10.8)

## 2024-04-22 PROCEDURE — 80053 COMPREHEN METABOLIC PANEL: CPT

## 2024-04-22 PROCEDURE — 93005 ELECTROCARDIOGRAM TRACING: CPT

## 2024-04-22 PROCEDURE — 93010 ELECTROCARDIOGRAM REPORT: CPT

## 2024-04-22 PROCEDURE — 81003 URINALYSIS AUTO W/O SCOPE: CPT

## 2024-04-22 PROCEDURE — 99214 OFFICE O/P EST MOD 30 MIN: CPT | Mod: 25

## 2024-04-22 PROCEDURE — 36415 COLL VENOUS BLD VENIPUNCTURE: CPT

## 2024-04-22 PROCEDURE — 83036 HEMOGLOBIN GLYCOSYLATED A1C: CPT

## 2024-04-22 PROCEDURE — 87086 URINE CULTURE/COLONY COUNT: CPT

## 2024-04-22 PROCEDURE — 85025 COMPLETE CBC W/AUTO DIFF WBC: CPT

## 2024-04-22 RX ORDER — PHENAZOPYRIDINE/HYOS/BUTABARB 150-0.3-15
0 TABLET ORAL
Refills: 0 | DISCHARGE

## 2024-04-22 NOTE — H&P PST ADULT - HISTORY OF PRESENT ILLNESS
Patient is a 64 year old  male presenting to PAST in preparation for  CYSTOSCOPY, TRANSURETHRAL RESECTION OF BLADDER  on 5/1/24  under GA anesthesia by Dr.Alexander Null  .    Patient states that he has bladder cancer, and reports having cystoscopy to remove bladder tumor that has come back.  Denies any recent blood in urine. Denies changes in bladder habits or difficulty urinating.    PATIENT CURRENTLY DENIES CHEST PAIN  SHORTNESS OF BREATH  PALPITATIONS,  DYSURIA, OR UPPER RESPIRATORY INFECTION IN PAST 2 WEEKS  denies travel outside the [place] in the past 30 days      Anesthesia Alert  YES--Difficult Airway CLASS 3 AIRWAY  NO--History of neck surgery or radiation  NO--Limited ROM of neck  NO--History of Malignant hyperthermia  NO--No personal or family history of Pseudocholinesterase deficiency.  NO--Prior Anesthesia Complication  NO--Latex Allergy  NO--Loose teeth  NO--History of Rheumatoid Arthritis  NO--Bleeding risk  NO--RUBIA  NO--Other_____    PT DENIES ANY RASHES, ABRASION, OR OPEN WOUNDS OR CUTS    AS PER THE PT, THIS IS HIS/HER COMPLETE MEDICAL AND SURGICAL HX, INCLUDING MEDICATIONS PRESCRIBED AND OVER THE COUNTER    Patient verbalized understanding of instructions and was given the opportunity to ask questions and have them answered.    pt denies any suicidal ideation or thoughts, pt states not a threat to self or others    Revised Cardiac Risk Index for Pre-Operative Risk from [employer]  on 4/22/2024  ** All calculations should be rechecked by clinician prior to use **    RESULT SUMMARY:  0 points  Class I Risk    3.9 %  30-day risk of death, MI, or cardiac arrest    From Duceppe 2017. These numbers are higher than those from the original study (Mehdi 1999). See Evidence for details.      INPUTS:  Elevated-risk surgery —> 0 = No  History of ischemic heart disease —> 0 = No  History of congestive heart failure —> 0 = No  History of cerebrovascular disease —> 0 = No  Pre-operative treatment with insulin —> 0 = No  Pre-operative creatinine >2 mg/dL / 176.8 µmol/L —> 0 = No    Duke Activity Status Index (DASI) from [employer]  on 4/22/2024  ** All calculations should be rechecked by clinician prior to use **    RESULT SUMMARY:  50.2 points  The higher the score (maximum 58.2), the higher the functional status.    8.91 METs        INPUTS:  Take care of self —> 2.75 = Yes  Walk indoors —> 1.75 = Yes  Walk 1&ndash;2 blocks on level ground —> 2.75 = Yes  Climb a flight of stairs or walk up a hill —> 5.5 = Yes  Run a short distance —> 0 = No  Do light work around the house —> 2.7 = Yes  Do moderate work around the house —> 3.5 = Yes  Do heavy work around the house —> 8 = Yes  Do yardwork —> 4.5 = Yes  Have sexual relations —> 5.25 = Yes  Participate in moderate recreational activities —> 6 = Yes  Participate in strenuous sports —> 7.5 = Yes

## 2024-04-22 NOTE — H&P PST ADULT - REASON FOR ADMISSION
Case Type: OP Non-block TimeSuite: [facility] Jose Null  Confirmed Surgery DateTime: 05- - 0:00PAST DateTime: 04- - 17:00Procedure: CYSTOSCOPY, TRANSURETHRAL RESECTION OF BLADDER  ERP?: NoLaterality: N/ALength of Procedure: 60 Minutes  Anesthesia Type: General

## 2024-04-22 NOTE — H&P PST ADULT - NSICDXPASTMEDICALHX_GEN_ALL_CORE_FT
PAST MEDICAL HISTORY:  Ascending aortic aneurysm     Bladder cancer     DM (diabetes mellitus)     HTN (hypertension)     Hypercholesterolemia NO MEDS    Mitral regurgitation

## 2024-04-23 DIAGNOSIS — C67.2 MALIGNANT NEOPLASM OF LATERAL WALL OF BLADDER: ICD-10-CM

## 2024-04-23 DIAGNOSIS — Z01.818 ENCOUNTER FOR OTHER PREPROCEDURAL EXAMINATION: ICD-10-CM

## 2024-04-23 LAB
A1C WITH ESTIMATED AVERAGE GLUCOSE RESULT: 6.5 % — HIGH (ref 4–5.6)
CULTURE RESULTS: NO GROWTH — SIGNIFICANT CHANGE UP
ESTIMATED AVERAGE GLUCOSE: 140 MG/DL — HIGH (ref 68–114)
SPECIMEN SOURCE: SIGNIFICANT CHANGE UP

## 2024-04-29 ENCOUNTER — APPOINTMENT (OUTPATIENT)
Dept: CARDIOLOGY | Facility: CLINIC | Age: 64
End: 2024-04-29
Payer: COMMERCIAL

## 2024-04-29 VITALS
HEART RATE: 75 BPM | BODY MASS INDEX: 35.88 KG/M2 | SYSTOLIC BLOOD PRESSURE: 130 MMHG | DIASTOLIC BLOOD PRESSURE: 72 MMHG | WEIGHT: 236 LBS

## 2024-04-29 DIAGNOSIS — I71.21 ANEURYSM OF THE ASCENDING AORTA, WITHOUT RUPTURE: ICD-10-CM

## 2024-04-29 DIAGNOSIS — Z01.810 ENCOUNTER FOR PREPROCEDURAL CARDIOVASCULAR EXAMINATION: ICD-10-CM

## 2024-04-29 DIAGNOSIS — R06.09 OTHER FORMS OF DYSPNEA: ICD-10-CM

## 2024-04-29 DIAGNOSIS — I34.0 NONRHEUMATIC MITRAL (VALVE) INSUFFICIENCY: ICD-10-CM

## 2024-04-29 DIAGNOSIS — I10 ESSENTIAL (PRIMARY) HYPERTENSION: ICD-10-CM

## 2024-04-29 PROCEDURE — 99214 OFFICE O/P EST MOD 30 MIN: CPT | Mod: 25

## 2024-04-29 PROCEDURE — 93000 ELECTROCARDIOGRAM COMPLETE: CPT

## 2024-04-29 RX ORDER — AMLODIPINE BESYLATE 5 MG/1
5 TABLET ORAL
Qty: 10 | Refills: 0 | Status: ACTIVE | COMMUNITY
Start: 2024-04-05

## 2024-04-29 RX ORDER — LOSARTAN POTASSIUM AND HYDROCHLOROTHIAZIDE 25; 100 MG/1; MG/1
100-25 TABLET ORAL DAILY
Refills: 0 | Status: ACTIVE | COMMUNITY

## 2024-04-29 RX ORDER — TIRZEPATIDE 10 MG/.5ML
10 INJECTION, SOLUTION SUBCUTANEOUS
Qty: 6 | Refills: 0 | Status: ACTIVE | COMMUNITY
Start: 2024-02-02

## 2024-04-29 RX ORDER — PIOGLITAZONE HYDROCHLORIDE 30 MG/1
30 TABLET ORAL DAILY
Refills: 0 | Status: ACTIVE | COMMUNITY

## 2024-04-29 NOTE — REASON FOR VISIT
[FreeTextEntry1] : Recurrent bladder tumor.  Scheduled for TURBT.  Otherwise, feels well.  No interval cardiac symptoms.  CT chest scheduled this month.

## 2024-04-29 NOTE — DISCUSSION/SUMMARY
[FreeTextEntry1] : No further cardiac testing required prior to TURBT. Continue losartan-HCTZ Continue amlodipine CT chest as planned Follow-up 6-months  [EKG obtained to assist in diagnosis and management of assessed problem(s)] : EKG obtained to assist in diagnosis and management of assessed problem(s)

## 2024-04-29 NOTE — PHYSICAL EXAM
[de-identified] : well appearing, overweight, no distress [de-identified] : Regular, normal S1-S2, no murmur, rub, gallop [de-identified] : CTA [de-identified] : alert, normal affect, logical conversation

## 2024-04-29 NOTE — ASSESSMENT
[FreeTextEntry1] : Moderate mitral regurgitation.  BP controlled.  Moderate ascending aortic aneurysm on ECHO.  Low to intermediate risk patient for perioperative cardiac events. Intermediate risk procedure. No cardiac decompensation. Reassuring stress test (2023)

## 2024-05-01 ENCOUNTER — OUTPATIENT (OUTPATIENT)
Dept: OUTPATIENT SERVICES | Facility: HOSPITAL | Age: 64
LOS: 1 days | Discharge: HOME | End: 2024-05-01
Payer: MEDICARE

## 2024-05-01 ENCOUNTER — TRANSCRIPTION ENCOUNTER (OUTPATIENT)
Age: 64
End: 2024-05-01

## 2024-05-01 VITALS — SYSTOLIC BLOOD PRESSURE: 149 MMHG | RESPIRATION RATE: 17 BRPM | DIASTOLIC BLOOD PRESSURE: 65 MMHG | HEART RATE: 68 BPM

## 2024-05-01 VITALS
WEIGHT: 235.01 LBS | TEMPERATURE: 96 F | DIASTOLIC BLOOD PRESSURE: 81 MMHG | HEART RATE: 69 BPM | OXYGEN SATURATION: 97 % | HEIGHT: 68 IN | SYSTOLIC BLOOD PRESSURE: 188 MMHG | RESPIRATION RATE: 18 BRPM

## 2024-05-01 DIAGNOSIS — Z90.49 ACQUIRED ABSENCE OF OTHER SPECIFIED PARTS OF DIGESTIVE TRACT: Chronic | ICD-10-CM

## 2024-05-01 DIAGNOSIS — Z90.89 ACQUIRED ABSENCE OF OTHER ORGANS: Chronic | ICD-10-CM

## 2024-05-01 DIAGNOSIS — C67.2 MALIGNANT NEOPLASM OF LATERAL WALL OF BLADDER: ICD-10-CM

## 2024-05-01 DIAGNOSIS — Z98.49 CATARACT EXTRACTION STATUS, UNSPECIFIED EYE: Chronic | ICD-10-CM

## 2024-05-01 DIAGNOSIS — Z98.890 OTHER SPECIFIED POSTPROCEDURAL STATES: Chronic | ICD-10-CM

## 2024-05-01 LAB — GLUCOSE BLDC GLUCOMTR-MCNC: 139 MG/DL — HIGH (ref 70–99)

## 2024-05-01 PROCEDURE — 88307 TISSUE EXAM BY PATHOLOGIST: CPT

## 2024-05-01 PROCEDURE — C1769: CPT

## 2024-05-01 PROCEDURE — 88307 TISSUE EXAM BY PATHOLOGIST: CPT | Mod: 26

## 2024-05-01 PROCEDURE — 82962 GLUCOSE BLOOD TEST: CPT

## 2024-05-01 RX ORDER — HYDROMORPHONE HYDROCHLORIDE 2 MG/ML
0.5 INJECTION INTRAMUSCULAR; INTRAVENOUS; SUBCUTANEOUS
Refills: 0 | Status: DISCONTINUED | OUTPATIENT
Start: 2024-05-01 | End: 2024-05-01

## 2024-05-01 RX ORDER — OXYCODONE AND ACETAMINOPHEN 5; 325 MG/1; MG/1
1 TABLET ORAL ONCE
Refills: 0 | Status: DISCONTINUED | OUTPATIENT
Start: 2024-05-01 | End: 2024-05-01

## 2024-05-01 RX ORDER — LOSARTAN POTASSIUM 100 MG/1
1 TABLET, FILM COATED ORAL
Refills: 0 | DISCHARGE

## 2024-05-01 RX ORDER — ONDANSETRON 8 MG/1
4 TABLET, FILM COATED ORAL ONCE
Refills: 0 | Status: DISCONTINUED | OUTPATIENT
Start: 2024-05-01 | End: 2024-05-01

## 2024-05-01 RX ORDER — SODIUM CHLORIDE 9 MG/ML
1000 INJECTION, SOLUTION INTRAVENOUS
Refills: 0 | Status: DISCONTINUED | OUTPATIENT
Start: 2024-05-01 | End: 2024-05-01

## 2024-05-01 RX ORDER — AMLODIPINE BESYLATE 2.5 MG/1
1 TABLET ORAL
Refills: 0 | DISCHARGE

## 2024-05-01 RX ORDER — TIRZEPATIDE 15 MG/.5ML
7.5 INJECTION, SOLUTION SUBCUTANEOUS
Refills: 0 | DISCHARGE

## 2024-05-01 RX ORDER — PHENAZOPYRIDINE HCL 100 MG
200 TABLET ORAL ONCE
Refills: 0 | Status: COMPLETED | OUTPATIENT
Start: 2024-05-01 | End: 2024-05-01

## 2024-05-01 RX ORDER — HYDROMORPHONE HYDROCHLORIDE 2 MG/ML
1 INJECTION INTRAMUSCULAR; INTRAVENOUS; SUBCUTANEOUS
Refills: 0 | Status: DISCONTINUED | OUTPATIENT
Start: 2024-05-01 | End: 2024-05-01

## 2024-05-01 RX ORDER — PIOGLITAZONE HYDROCHLORIDE 15 MG/1
1 TABLET ORAL
Refills: 0 | DISCHARGE

## 2024-05-01 RX ADMIN — Medication 200 MILLIGRAM(S): at 15:31

## 2024-05-01 NOTE — ASU DISCHARGE PLAN (ADULT/PEDIATRIC) - ASU DC SPECIAL INSTRUCTIONSFT
You may feel worsening urinary frequency and urgency, and see blood-tinged urine.   Take your Rx as prescribed

## 2024-05-01 NOTE — ASU DISCHARGE PLAN (ADULT/PEDIATRIC) - CARE PROVIDER_API CALL
Jose Null  Urology  [address] [place] [address], [address]  [place] [address]  Phone: [phone]  Fax: [phone]  Established Patient  Follow Up Time: 1 week

## 2024-05-01 NOTE — ASU PATIENT PROFILE, ADULT - FALL HARM RISK - HARM RISK INTERVENTIONS

## 2024-05-01 NOTE — ASU DISCHARGE PLAN (ADULT/PEDIATRIC) - BATHING
Progress Notes by Elliot Atkinson MD at 05/02/17 09:10 AM     Author:  Elliot Atkinson MD Service:  (none) Author Type:  Physician     Filed:  05/02/17 09:58 AM Encounter Date:  5/2/2017 Status:  Signed     :  Elliot Atkinson MD (Physician)            Roomed by Radha CRUZ  LV:[KF1.1T] 03/18/2016 with Dr. Carrillo[KF1.1M]      SUBJECTIVE:  Current outpatient prescriptions       Medication  Sig Dispense Refill   • mometasone (ELOCON) 0.1 % cream Apply to the affected area twice daily until clear 45 g 0   • Calcium Carbonate-Vitamin D 600-400 MG-UNIT per chew tablet Take 1 Tab by mouth daily. 1200mg calcium & 800IU vitamin D3     • Cholecalciferol (VITAMIN D) 1000 UNITS Cap Take 1,000 Units by mouth daily.       Patient Active Problem List    Diagnosis    • Folliculitis and perifolliculitis   • History of mononucleosis   • HPV in female   • Anemia   • Menorrhagia   • Anxiety   • Dry mouth   • Autonomic nervous system disorder     ROS:[KF1.1T]  --General: In good health[KF1.1M]   Amoxicillin      Silvia Martins is an 41 year old female who presents for evaluation of[KF1.1T] a growth to the left upper eyelid. Present for a few years. Asymptomatic but bothersome. Patient also has a rough patch to the right gluteus that she states she scratched and it started bleeding profusely. Patient states it was dry, raised, and white. Patient would also like a refill of doxycycline that Dr. Townsend previously prescribed for acne.[KF1.1M]        Problem #[FK1.1T] 1[FK1.1M]: SKIN TAGS  SUBJECTIVE: The skin tags have been present for[FK1.1T] several months[FK1.1M].    Previous treatments:[FK1.1T] none[FK1.1M].   Symptoms:[FK1.1T] rough feeling[FK1.1M]  OBJECTIVE:[FK1.1T] 1[FK1.1M] skin tags are  present on left[FK1.1T] eyelid[FK1.1M].  Description: typical skin tag  .  No signs of cancerous change.  ASSESSMENT: Skin Tag  ROS:  --General: In good health and   --Heme: No bleeding problems    PROCEDURE:  --Various treatment methods, side effects  and failure rates have been discussed.  --Informed consent obtained from the patient   --Anesthesia:[FK1.1T] none[FK1.1M]  --Removed with iris scissors and pick ups.  --Hemostasis: pressure and aluminum chloride  Patient instructed to call if any problems develop.      Problem #[FK1.1T] 1[FK1.1M]: ACNE  SUBJECTIVE: It has been present for[FK1.1T] several years[FK1.1M] and is[FK1.1T] episodic[FK1.1M].  Previous treatments:[FK1.1T] see current medications[FK1.1M].  Risk factors:[FK1.1T] patient is not pregnant and stress[FK1.1M].  Symptoms:[FK1.1T] unsightly appearance and rough feeling  Keratosis[FK1.1M] right[FK1.1T] buttock-[FK1.1M] No pain,   itching.   OBJECTIVE:[FK1.1T]  Grade II - Moderate, inflam c comedones, papules & occ. pustules present on forehead, cheeks, nose and perioral.  Scaly spot[FK1.1M] right[FK1.1T] buttock pt scratched and it bled once-[FK1.1M]seborrheic keratosis flesh colored, evenly pigmented, symmetric, sharply marginated   inspection of conjunctiva and eyelids:normal  well developed, well nourished  alert and oriented x3   The skin of the head, neck, scalp,  chest, back, each arm, each leg, both hands, both feet was examined and found to be normal other than the noted abnormalities.  ASSESSMENT:[FK1.1T] acne  sk[FK1.1M]  PLAN[FK1.1T] doryx refilled[FK1.1M]  Side effects reviewed[FK1.1T]  Reassurance on sk  See me 6 mos[FK1.1M]  Electronically Signed by:    Elliot Atkinson MD , 5/2/2017[FK1.2T]       Revision History        User Key Date/Time User Provider Type Action    > FK1.2 05/02/17 09:58 AM Elliot Atkinson MD Physician Sign     FK1.1 05/02/17 09:53 AM Elliot Atkinson MD Physician      KF1.1 05/02/17 09:17 AM Radha Gonzales CMA Certified Medical Assistant Sign at close encounter    M - Manual, T - Template             No change

## 2024-05-01 NOTE — ASU DISCHARGE PLAN (ADULT/PEDIATRIC) - CALL YOUR DOCTOR IF YOU HAVE ANY OF THE FOLLOWING:
101F/Bleeding that does not stop/Pain not relieved by Medications/Fever greater than (need to indicate Fahrenheit or Celsius)/Unable to urinate

## 2024-05-01 NOTE — ASU DISCHARGE PLAN (ADULT/PEDIATRIC) - FREQUENT HAND WASHING PREVENTS THE SPREAD OF INFECTION.
February 15, 2023    Anastasia Soares  [address] [place] [address]             [facility]  [address]  [place] [address]  Phone: [phone]  Fax: [phone] Patient: Anastasia Soares  : 1938    Referring Doctor:Dr. Shabbir Castillo   Procedure:EGD/Colonoscopy   Type of Anesthesia:    Date of Last Stent:  Date of Last Office Visit:2022    Current Outpatient Medications   Medication Sig    aluminum-magnesium hydroxide-simethicone 200-200-20 mg/5 mL Susp 30 mL, lidocaine HCl 2% 2 % Soln 15 mL, phenobarb-hyoscy-atropine-scop 16.2-0.1037 -0.0194 mg/5 mL Elix 10 mL Take by mouth as needed.    baclofen (LIORESAL) 10 MG tablet Take 10 mg by mouth 3 (three) times daily as needed.    celecoxib (CELEBREX) 100 MG capsule Take 100 mg by mouth once daily.    dexlansoprazole (DEXILANT) 30 mg CpDM Take by mouth as needed.    diclofenac sodium (VOLTAREN) 1 % Gel APPLY 2 GRAMS TO AFFECTED AREA(S) EVERY 8 HOURS    docusate sodium (COLACE) 100 MG capsule 100 mg.    DULoxetine (CYMBALTA) 30 MG capsule Take 30 mg by mouth once daily.    famotidine (PEPCID) 40 MG tablet 40 mg.    FLUZONE HIGHDOSE QUAD 20-21  mcg/0.7 mL Syrg PHARMACIST ADMINISTERED IMMUNIZATION ADMINISTERED AT TIME OF DISPENSING    gabapentin (NEURONTIN) 100 MG capsule Take 1 capsule (100 mg total) by mouth 3 (three) times daily.    gentamicin (GARAMYCIN) 0.3 % ophthalmic solution     HYDROcodone-acetaminophen (NORCO) 5-325 mg per tablet TAKE 1 TABLET BY MOUTH ONCE DAILY AT BEDTIME AS NEEDED FOR PAIN    lansoprazole (PREVACID) 30 MG capsule Take 30 mg by mouth once daily.    LINZESS 72 mcg Cap capsule TAKE 1 CAPSULE BY MOUTH ONCE DAILY. DO NOT CRUSH OR CHEW    omeprazole (PRILOSEC) 40 MG capsule 40 mg.    pregabalin (LYRICA) 25 MG capsule Take 25 mg by mouth every 12 (twelve) hours.    RESTASIS MULTIDOSE 0.05 % Drop INSTILL 1 DROP INTO EACH  EYE TWICE DAILY    tramadol (ULTRAM) 50 mg tablet Take 50 mg by mouth every 6 (six) hours as needed for Pain.     No current facility-administered medications for this visit.       This patient has been assessed for risk factors for clearance of surgery with the following stipulations:    ___ No contraindications    ___ Recommendations for antiplatelet/anticoagulant medications:    X    Cleared for surgery with the following contraindications/precautions: moderate risks.     ___ Not cleared for surgery due to the following reasons:      If you have any questions regarding the above, please contact my office at [phone].    Sincerely,        Jordana Talbot, NP-C  Department of Cardiology   [facility]          Statement Selected

## 2024-05-01 NOTE — BRIEF OPERATIVE NOTE - NSICDXBRIEFPROCEDURE_GEN_ALL_CORE_FT
PROCEDURES:  Cystourethroscopy with resection of large tumor of bladder 01-May-2024 14:54:10  Jose Null

## 2024-05-01 NOTE — ASU PATIENT PROFILE, ADULT - NSICDXPASTMEDICALHX_GEN_ALL_CORE_FT
PAST MEDICAL HISTORY:  Ascending aortic aneurysm     Bladder cancer     DM (diabetes mellitus)     HTN (hypertension)     Mitral regurgitation

## 2024-05-01 NOTE — ASU DISCHARGE PLAN (ADULT/PEDIATRIC) - NS MD DC FALL RISK RISK
For information on Fall & Injury Prevention, visit: [email or web address]/[email or web address]-mobility OR  [email or web address]-injury OR  https://www.cdc.gov/steadi/patient.html

## 2024-05-03 LAB — SURGICAL PATHOLOGY STUDY: SIGNIFICANT CHANGE UP

## 2024-05-04 ENCOUNTER — EMERGENCY (EMERGENCY)
Facility: HOSPITAL | Age: 64
LOS: 0 days | Discharge: HOME | End: 2024-05-04
Attending: STUDENT IN AN ORGANIZED HEALTH CARE EDUCATION/TRAINING PROGRAM
Payer: MEDICARE

## 2024-05-04 ENCOUNTER — EMERGENCY (EMERGENCY)
Facility: HOSPITAL | Age: 64
LOS: 0 days | Discharge: HOME | End: 2024-05-04
Attending: EMERGENCY MEDICINE
Payer: MEDICARE

## 2024-05-04 VITALS — HEART RATE: 82 BPM

## 2024-05-04 VITALS
WEIGHT: 235.01 LBS | SYSTOLIC BLOOD PRESSURE: 176 MMHG | TEMPERATURE: 99 F | DIASTOLIC BLOOD PRESSURE: 93 MMHG | HEART RATE: 90 BPM | OXYGEN SATURATION: 97 % | HEIGHT: 68 IN | RESPIRATION RATE: 18 BRPM

## 2024-05-04 VITALS
HEART RATE: 108 BPM | OXYGEN SATURATION: 98 % | DIASTOLIC BLOOD PRESSURE: 80 MMHG | TEMPERATURE: 98 F | HEIGHT: 68 IN | RESPIRATION RATE: 19 BRPM | SYSTOLIC BLOOD PRESSURE: 125 MMHG

## 2024-05-04 DIAGNOSIS — R33.9 RETENTION OF URINE, UNSPECIFIED: ICD-10-CM

## 2024-05-04 DIAGNOSIS — T83.091A OTHER MECHANICAL COMPLICATION OF INDWELLING URETHRAL CATHETER, INITIAL ENCOUNTER: ICD-10-CM

## 2024-05-04 DIAGNOSIS — Z98.890 OTHER SPECIFIED POSTPROCEDURAL STATES: Chronic | ICD-10-CM

## 2024-05-04 DIAGNOSIS — Z90.49 ACQUIRED ABSENCE OF OTHER SPECIFIED PARTS OF DIGESTIVE TRACT: Chronic | ICD-10-CM

## 2024-05-04 DIAGNOSIS — Z85.51 PERSONAL HISTORY OF MALIGNANT NEOPLASM OF BLADDER: ICD-10-CM

## 2024-05-04 DIAGNOSIS — N39.0 URINARY TRACT INFECTION, SITE NOT SPECIFIED: ICD-10-CM

## 2024-05-04 DIAGNOSIS — Z90.89 ACQUIRED ABSENCE OF OTHER ORGANS: Chronic | ICD-10-CM

## 2024-05-04 DIAGNOSIS — Z98.49 CATARACT EXTRACTION STATUS, UNSPECIFIED EYE: Chronic | ICD-10-CM

## 2024-05-04 DIAGNOSIS — E11.9 TYPE 2 DIABETES MELLITUS WITHOUT COMPLICATIONS: ICD-10-CM

## 2024-05-04 DIAGNOSIS — I10 ESSENTIAL (PRIMARY) HYPERTENSION: ICD-10-CM

## 2024-05-04 LAB
APPEARANCE UR: ABNORMAL
BACTERIA # UR AUTO: NEGATIVE /HPF — SIGNIFICANT CHANGE UP
BILIRUB UR-MCNC: ABNORMAL
CAST: 0 /LPF — SIGNIFICANT CHANGE UP (ref 0–4)
COLOR SPEC: ABNORMAL
DIFF PNL FLD: ABNORMAL
GLUCOSE UR QL: NEGATIVE MG/DL — SIGNIFICANT CHANGE UP
KETONES UR-MCNC: NEGATIVE MG/DL — SIGNIFICANT CHANGE UP
LEUKOCYTE ESTERASE UR-ACNC: ABNORMAL
NITRITE UR-MCNC: POSITIVE
PH UR: 5.5 — SIGNIFICANT CHANGE UP (ref 5–8)
PROT UR-MCNC: 100 MG/DL
RBC CASTS # UR COMP ASSIST: 1708 /HPF — HIGH (ref 0–4)
SP GR SPEC: 1.01 — SIGNIFICANT CHANGE UP (ref 1–1.03)
SQUAMOUS # UR AUTO: 3 /HPF — SIGNIFICANT CHANGE UP (ref 0–5)
UROBILINOGEN FLD QL: 0.2 MG/DL — SIGNIFICANT CHANGE UP (ref 0.2–1)
WBC UR QL: 13 /HPF — HIGH (ref 0–5)

## 2024-05-04 PROCEDURE — 87086 URINE CULTURE/COLONY COUNT: CPT

## 2024-05-04 PROCEDURE — 99283 EMERGENCY DEPT VISIT LOW MDM: CPT

## 2024-05-04 PROCEDURE — 99285 EMERGENCY DEPT VISIT HI MDM: CPT

## 2024-05-04 PROCEDURE — 99284 EMERGENCY DEPT VISIT MOD MDM: CPT | Mod: FS

## 2024-05-04 PROCEDURE — 81001 URINALYSIS AUTO W/SCOPE: CPT

## 2024-05-04 NOTE — ED PROVIDER NOTE - OBJECTIVE STATEMENT
64-year-old male with past medical history of ascending aortic aneurysm, bladder cancer, hypertension, and diabetes.  Reports that he had resection of prostate tumor few days ago and was in this emergency department earlier this morning for Angel catheter dysfunction.  Reports that Angel catheter was flushed and was functioning, so he was discharged.  Reports that he noticed the catheter stopped draining again and started noticing suprapubic crushing sensation, so came to the ED for evaluation.  Denies fever, shortness of breath, chest pain, nausea, vomiting, and pain or discomfort elsewhere.

## 2024-05-04 NOTE — ED ADULT NURSE NOTE - CHIEF COMPLAINT QUOTE
pt states that he was trying to have a BM today and then his catheter became clogged x1 hour pta , states that this has happened x3-4 hours prior and was seen in ED and catheter was irrigated and then started working again, cathter was placed last Thrusday

## 2024-05-04 NOTE — CONSULT NOTE ADULT - SUBJECTIVE AND OBJECTIVE BOX
UROLOGY CONSULT  Pt is a 65yo M with hx of bladder tumor s/p TURBT on  presenting to the ED for evaluation of patino catheter . Pt was in ER earlier today for similar issue, at that time patino was flushed with return of urine into collection bag and pt was d/c'd home. Pt states patino was draining OK until about 1 hour prior to arrival. States he was using the bathroom and may have pulled on the catheter-- presents now after having pain to suprapubic region/penile shaft.     PAST MEDICAL & SURGICAL HISTORY:  DM (diabetes mellitus)    HTN (hypertension)    Bladder cancer    Ascending aortic aneurysm    Mitral regurgitation    History of cholecystectomy    History of appendectomy    History of tonsillectomy    H/O cataract extraction  b/l eyes with b/l iol's    History of transurethral resection of bladder tumor (TURBT)        MEDICATIONS  (STANDING):    MEDICATIONS  (PRN):      Allergies  No Known Allergies      SOCIAL HISTORY: No illicit drug use    FAMILY HISTORY:    REVIEW OF SYSTEMS   [x] A ten-point review of systems was otherwise negative except as noted.    Vital Signs Last 24 Hrs  T(C): 37 (04 May 2024 19:15), Max: 37 (04 May 2024 19:15)  T(F): 98.6 (04 May 2024 19:15), Max: 98.6 (04 May 2024 19:15)  HR: 90 (04 May 2024 19:15) (82 - 108)  BP: 176/93 (04 May 2024 19:15) (125/80 - 176/93)  RR: 18 (04 May 2024 19:15) (18 - 19)  SpO2: 97% (04 May 2024 19:15) (97% - 98%)    Parameters below as of 04 May 2024 19:15  Patient On (Oxygen Delivery Method): room air      PHYSICAL EXAM:  GEN: NAD  NEURO: Awake and alert  SKIN: Good color, non diaphoretic  RESP: Non-labored breathing  ABDO: Soft, obese abdomen   : +18Fr patino in place, tea colored drainage noted in leg bag; patino advanced to hub and irrigated with 500cc SW, minimal small clots noted, left draining light tea colored/yellow.   EXT: CERVANTES x 4            LABS:            Urinalysis Basic - ( 04 May 2024 12:46 )    Color: Orange / Appearance: Turbid / S.014 / pH: x  Gluc: x / Ketone: Negative mg/dL  / Bili: Small / Urobili: 0.2 mg/dL   Blood: x / Protein: 100 mg/dL / Nitrite: Positive   Leuk Esterase: Moderate / RBC: 1708 /HPF / WBC 13 /HPF   Sq Epi: x / Non Sq Epi: 3 /HPF / Bacteria: Negative /HPF            RADIOLOGY & ADDITIONAL STUDIES:

## 2024-05-04 NOTE — ED ADULT NURSE NOTE - NSFALLRISKINTERV_ED_ALL_ED

## 2024-05-04 NOTE — ED PROVIDER NOTE - PROGRESS NOTE DETAILS
Spoke with urology who evaluated patient and was able to fix the urine catheter.  Patient is asymptomatic and stable for discharge.

## 2024-05-04 NOTE — ED PROVIDER NOTE - PATIENT PORTAL LINK FT
You can access the FollowMyHealth Patient Portal offered by [facility] by registering at the following website: [email or web address]. By joining [employer]’s FollowMyHealth portal, you will also be able to view your health information using other applications (apps) compatible with our system.

## 2024-05-04 NOTE — CONSULT NOTE ADULT - ASSESSMENT
Pt is a 63yo M with hx of bladder tumor s/p TURBT on 5/1 presenting to the ED for evaluation of patino catheter    ·	18Fr patino noted in place. Catheter advanced to hub and balloon reinflated. Irrigated catheter with 500cc SW with minimal small clots noted.  Left draining light tea colored/yellow.   ·	Stat lock placed, pt instructed to keep patino attached to stat lock  ·	Stool softeners as needed   ·	Pt to f/u in the office with Dr. Null   ·	s/w ED team

## 2024-05-04 NOTE — ED PROVIDER NOTE - PHYSICAL EXAMINATION
CONSTITUTIONAL: in no apparent distress.   ENT: Hearing is intact with good acuity to spoken voice.  Patient is speaking clearly, not muffled and airway is intact.   RESPIRATORY: No signs of respiratory distress. Lung sounds are clear in all lobes bilaterally without rales, rhonchi, or wheezes.  CARDIOVASCULAR: Regular rate and rhythm.   GI: Abdomen is soft, non-tender, and without distention. Bowel sounds are present and normoactive in all four quadrants. No masses are noted.   BACK: No evidence of trauma or deformity. No CVA tenderness bilaterally. Normal ROM.   NEURO: A & O x 3. Normal speech. No focal deficit.  PSYCHOLOGICAL: Appropriate mood and affect. Good judgement and insight.

## 2024-05-04 NOTE — ED PROVIDER NOTE - PROGRESS NOTE DETAILS
TJY: POCUS shows retention, suggest balloon in bladder.  Unable to flush with 10cc aliquots despite easy flow in.     Urology consutled TJY: after flushing, pt with release of urine into bag.No large clots, but blood tinged.

## 2024-05-04 NOTE — ED PROVIDER NOTE - CLINICAL SUMMARY MEDICAL DECISION MAKING FREE TEXT BOX
.    65y/o M, s/p bladder tumor resection ([place]) 3 days ago, status post indwelling Angel catheter, presents with decreased urine flow and suprapubic fullness.  No fever, flank pain, vomiting.    Exam as noted above.    Additional history obtained from chart review and from patient's family at the bedside.    Differential diagnosis includes mechanical obstruction, UTI, hematuria/clots.    Angel catheter was flushed by ED team and urology at the bedside.  Angel catheter is flowing well.    UA sent, results to be followed up by ED team.  Patient feels much better, is eager for discharge.    IMP: Angel catheter dysfunction.  Resolved.    Patient stable for discharge with continued outpatient follow-up, patient understands signs symptoms for ED return.  DC home.

## 2024-05-04 NOTE — ED PROVIDER NOTE - CLINICAL SUMMARY MEDICAL DECISION MAKING FREE TEXT BOX
Patient presents with nondraining Angel catheter.  Some lower abdominal discomfort.  Suctioning approximately 1 hour prior to arrival.  Leg bag empty.  Given recent procedure 3 days prior by Dr. Deluna urology team consulted.  Angel catheter advanced with improvement in drainage in the Angel catheter.  Patient already on antibiotics for UTI.  No fevers.  Vitals within normal limits.  Discharged with urology follow-up and return precautions.

## 2024-05-04 NOTE — ED PROVIDER NOTE - PATIENT PORTAL LINK FT
You can access the FollowMyHealth Patient Portal offered by [facility] by registering at the following website: [email or web address]. By joining [employer]’s FollowMyHealth portal, you will also be able to view your health information using other applications (apps) compatible with our system.

## 2024-05-04 NOTE — ED PROVIDER NOTE - PHYSICAL EXAMINATION
VITAL SIGNS: I have reviewed nursing notes and confirm.  CONSTITUTIONAL: NAD  SKIN: Warm dry, normal skin turgor  HEAD: NCAT  EYES: No conjunctival injection, scleral anicteric  ENT: MMM  NECK: Supple; FROM.   ABD: soft, +suprapubic fullness. Angel catheter with small quantity bloody urine in bag.   EXT: No pedal edema, no calf tenderness  NEURO: Grossly normal examination, CN grossly intact  PSYCH: Cooperative, appropriate

## 2024-05-04 NOTE — ED PROVIDER NOTE - ATTENDING APP SHARED VISIT CONTRIBUTION OF CARE
65yo male presenting 3 days s/p bladder tumor resection with Chaka presenting with urinary catheter blockage. Was in the ED earlier today for similar issue. States that when he tries to have a bowel movement he felt his catheter clogged. Earlier catheter was irrigated with improvement. no fevers. Currently on abx by the urologist. no abdominal pain. no n/v.     CONSTITUTIONAL: Well-developed; well-nourished; in no acute distress.   SKIN: warm, dry  HEAD: Normocephalic; atraumatic.  EYES: PERRL, EOMI, no conjunctival erythema  ENT: No nasal discharge; airway clear.  NECK: Supple; non tender.  ABD: soft non tender, non distended, no rebound or guarding. no suprapubic tenderness. + indwelling catheter with empty leg bag.   EXT: Normal ROM.  5/5 strength in all 4 extremities   LYMPH: No acute cervical adenopathy.  NEURO: Alert, oriented, grossly unremarkable. neurovascularly intact  PSYCH: Cooperative, appropriate.

## 2024-05-04 NOTE — ED PROVIDER NOTE - OBJECTIVE STATEMENT
63yo male presenting 3 days s/p bladder tumor resection with Chaka presenting with urinary catheter issues, has had very scant urine flow over the past 2 days, presenting with suprapubic "fullness" causing discomfort, no other complaints.

## 2024-05-04 NOTE — ED ADULT NURSE NOTE - OBJECTIVE STATEMENT
Pt with Angel catheter placed on Thursday at urology office C/O  pain  on the lower abdomen and groin ,  no urin coming in the Angel  catheter  start yesterday .,pt denies fever chills N/V/D .

## 2024-05-04 NOTE — ED PROVIDER NOTE - NSFOLLOWUPINSTRUCTIONS_ED_ALL_ED_FT
Please make sure to follow up with your primary care doctor in 3 days.    Please make sure to follow up with your urologist in 3 days.

## 2024-05-05 DIAGNOSIS — C67.9 MALIGNANT NEOPLASM OF BLADDER, UNSPECIFIED: ICD-10-CM

## 2024-05-05 DIAGNOSIS — I10 ESSENTIAL (PRIMARY) HYPERTENSION: ICD-10-CM

## 2024-05-05 DIAGNOSIS — E11.9 TYPE 2 DIABETES MELLITUS WITHOUT COMPLICATIONS: ICD-10-CM

## 2024-05-05 DIAGNOSIS — D30.3 BENIGN NEOPLASM OF BLADDER: ICD-10-CM

## 2024-05-05 DIAGNOSIS — E78.00 PURE HYPERCHOLESTEROLEMIA, UNSPECIFIED: ICD-10-CM

## 2024-05-05 DIAGNOSIS — Z90.49 ACQUIRED ABSENCE OF OTHER SPECIFIED PARTS OF DIGESTIVE TRACT: ICD-10-CM

## 2024-05-06 LAB
CULTURE RESULTS: NO GROWTH — SIGNIFICANT CHANGE UP
SPECIMEN SOURCE: SIGNIFICANT CHANGE UP

## 2024-05-12 ENCOUNTER — OUTPATIENT (OUTPATIENT)
Dept: OUTPATIENT SERVICES | Facility: HOSPITAL | Age: 64
LOS: 1 days | End: 2024-05-12
Payer: COMMERCIAL

## 2024-05-12 ENCOUNTER — RESULT REVIEW (OUTPATIENT)
Age: 64
End: 2024-05-12

## 2024-05-12 DIAGNOSIS — Z98.49 CATARACT EXTRACTION STATUS, UNSPECIFIED EYE: Chronic | ICD-10-CM

## 2024-05-12 DIAGNOSIS — R07.9 CHEST PAIN, UNSPECIFIED: ICD-10-CM

## 2024-05-12 DIAGNOSIS — Z90.49 ACQUIRED ABSENCE OF OTHER SPECIFIED PARTS OF DIGESTIVE TRACT: Chronic | ICD-10-CM

## 2024-05-12 DIAGNOSIS — Z90.89 ACQUIRED ABSENCE OF OTHER ORGANS: Chronic | ICD-10-CM

## 2024-05-12 DIAGNOSIS — Z00.8 ENCOUNTER FOR OTHER GENERAL EXAMINATION: ICD-10-CM

## 2024-05-12 DIAGNOSIS — Z98.890 OTHER SPECIFIED POSTPROCEDURAL STATES: Chronic | ICD-10-CM

## 2024-05-12 PROCEDURE — 71275 CT ANGIOGRAPHY CHEST: CPT

## 2024-05-12 PROCEDURE — 71275 CT ANGIOGRAPHY CHEST: CPT | Mod: 26,MH

## 2024-05-13 DIAGNOSIS — R07.9 CHEST PAIN, UNSPECIFIED: ICD-10-CM

## 2024-10-28 ENCOUNTER — APPOINTMENT (OUTPATIENT)
Dept: CARDIOLOGY | Facility: CLINIC | Age: 64
End: 2024-10-28

## 2024-10-28 VITALS
WEIGHT: 251 LBS | HEIGHT: 68 IN | DIASTOLIC BLOOD PRESSURE: 70 MMHG | BODY MASS INDEX: 38.04 KG/M2 | HEART RATE: 66 BPM | SYSTOLIC BLOOD PRESSURE: 142 MMHG

## 2024-10-28 PROCEDURE — 93000 ELECTROCARDIOGRAM COMPLETE: CPT

## 2024-10-28 PROCEDURE — 99204 OFFICE O/P NEW MOD 45 MIN: CPT

## 2025-04-30 ENCOUNTER — NON-APPOINTMENT (OUTPATIENT)
Age: 65
End: 2025-04-30

## 2025-05-01 ENCOUNTER — RESULT REVIEW (OUTPATIENT)
Age: 65
End: 2025-05-01

## 2025-05-01 ENCOUNTER — OUTPATIENT (OUTPATIENT)
Dept: OUTPATIENT SERVICES | Facility: HOSPITAL | Age: 65
LOS: 1 days | End: 2025-05-01
Payer: MEDICARE

## 2025-05-01 DIAGNOSIS — Z90.89 ACQUIRED ABSENCE OF OTHER ORGANS: Chronic | ICD-10-CM

## 2025-05-01 DIAGNOSIS — Z90.49 ACQUIRED ABSENCE OF OTHER SPECIFIED PARTS OF DIGESTIVE TRACT: Chronic | ICD-10-CM

## 2025-05-01 DIAGNOSIS — I71.21 ANEURYSM OF THE ASCENDING AORTA, WITHOUT RUPTURE: ICD-10-CM

## 2025-05-01 DIAGNOSIS — Z98.890 OTHER SPECIFIED POSTPROCEDURAL STATES: Chronic | ICD-10-CM

## 2025-05-01 DIAGNOSIS — Z98.49 CATARACT EXTRACTION STATUS, UNSPECIFIED EYE: Chronic | ICD-10-CM

## 2025-05-01 PROCEDURE — 71250 CT THORAX DX C-: CPT | Mod: 26

## 2025-05-01 PROCEDURE — 71250 CT THORAX DX C-: CPT

## 2025-05-02 ENCOUNTER — APPOINTMENT (OUTPATIENT)
Dept: CARDIOLOGY | Facility: CLINIC | Age: 65
End: 2025-05-02

## 2025-05-02 DIAGNOSIS — I71.21 ANEURYSM OF THE ASCENDING AORTA, WITHOUT RUPTURE: ICD-10-CM

## 2025-05-02 DIAGNOSIS — I34.0 NONRHEUMATIC MITRAL (VALVE) INSUFFICIENCY: ICD-10-CM

## 2025-05-02 PROCEDURE — 93306 TTE W/DOPPLER COMPLETE: CPT

## 2025-05-16 ENCOUNTER — APPOINTMENT (OUTPATIENT)
Dept: CARDIOLOGY | Facility: CLINIC | Age: 65
End: 2025-05-16
Payer: MEDICARE

## 2025-05-16 VITALS
SYSTOLIC BLOOD PRESSURE: 132 MMHG | HEART RATE: 77 BPM | HEIGHT: 68 IN | WEIGHT: 256 LBS | DIASTOLIC BLOOD PRESSURE: 64 MMHG | BODY MASS INDEX: 38.8 KG/M2

## 2025-05-16 DIAGNOSIS — E78.5 HYPERLIPIDEMIA, UNSPECIFIED: ICD-10-CM

## 2025-05-16 DIAGNOSIS — I34.0 NONRHEUMATIC MITRAL (VALVE) INSUFFICIENCY: ICD-10-CM

## 2025-05-16 DIAGNOSIS — I71.21 ANEURYSM OF THE ASCENDING AORTA, WITHOUT RUPTURE: ICD-10-CM

## 2025-05-16 DIAGNOSIS — R91.1 SOLITARY PULMONARY NODULE: ICD-10-CM

## 2025-05-16 DIAGNOSIS — I10 ESSENTIAL (PRIMARY) HYPERTENSION: ICD-10-CM

## 2025-05-16 PROCEDURE — 99214 OFFICE O/P EST MOD 30 MIN: CPT

## 2025-05-16 PROCEDURE — 93000 ELECTROCARDIOGRAM COMPLETE: CPT

## 2025-05-16 RX ORDER — ROSUVASTATIN CALCIUM 5 MG/1
5 TABLET, FILM COATED ORAL DAILY
Qty: 90 | Refills: 2 | Status: ACTIVE | COMMUNITY
Start: 2025-05-16 | End: 1900-01-01

## 2025-05-16 RX ORDER — AMLODIPINE BESYLATE 10 MG/1
10 TABLET ORAL
Refills: 0 | Status: ACTIVE | COMMUNITY

## 2025-06-16 ENCOUNTER — APPOINTMENT (OUTPATIENT)
Dept: PULMONOLOGY | Facility: CLINIC | Age: 65
End: 2025-06-16
Payer: MEDICARE

## 2025-06-16 VITALS
OXYGEN SATURATION: 98 % | DIASTOLIC BLOOD PRESSURE: 78 MMHG | WEIGHT: 258 LBS | HEART RATE: 81 BPM | BODY MASS INDEX: 39.23 KG/M2 | SYSTOLIC BLOOD PRESSURE: 118 MMHG

## 2025-06-16 PROBLEM — Z85.51 HISTORY OF BLADDER CANCER: Status: RESOLVED | Noted: 2025-06-16 | Resolved: 2025-06-16

## 2025-06-16 PROBLEM — Z86.79 HISTORY OF HYPERTENSION: Status: RESOLVED | Noted: 2025-06-16 | Resolved: 2025-06-16

## 2025-06-16 PROBLEM — Z83.438 FAMILY HISTORY OF HYPERLIPIDEMIA: Status: ACTIVE | Noted: 2025-06-16

## 2025-06-16 PROBLEM — Z86.39 HISTORY OF HYPERGLYCEMIA: Status: RESOLVED | Noted: 2025-06-16 | Resolved: 2025-06-16

## 2025-06-16 PROBLEM — R05.3 CHRONIC COUGH: Status: ACTIVE | Noted: 2025-06-16

## 2025-06-16 PROBLEM — E66.9 MODERATE OBESITY: Status: ACTIVE | Noted: 2025-06-16

## 2025-06-16 PROCEDURE — G2211 COMPLEX E/M VISIT ADD ON: CPT

## 2025-06-16 PROCEDURE — 99204 OFFICE O/P NEW MOD 45 MIN: CPT

## 2025-06-16 RX ORDER — AZELASTINE HYDROCHLORIDE 137 UG/1
0.1 SPRAY, METERED NASAL
Qty: 1 | Refills: 3 | Status: ACTIVE | COMMUNITY
Start: 2025-06-16 | End: 1900-01-01

## 2025-06-20 ENCOUNTER — APPOINTMENT (OUTPATIENT)
Dept: SLEEP CENTER | Facility: HOSPITAL | Age: 65
End: 2025-06-20
Payer: MEDICARE

## 2025-06-20 ENCOUNTER — OUTPATIENT (OUTPATIENT)
Dept: OUTPATIENT SERVICES | Facility: HOSPITAL | Age: 65
LOS: 1 days | Discharge: HOME | End: 2025-06-20
Payer: MEDICARE

## 2025-06-20 DIAGNOSIS — Z90.49 ACQUIRED ABSENCE OF OTHER SPECIFIED PARTS OF DIGESTIVE TRACT: Chronic | ICD-10-CM

## 2025-06-20 DIAGNOSIS — Z98.49 CATARACT EXTRACTION STATUS, UNSPECIFIED EYE: Chronic | ICD-10-CM

## 2025-06-20 DIAGNOSIS — Z98.890 OTHER SPECIFIED POSTPROCEDURAL STATES: Chronic | ICD-10-CM

## 2025-06-20 DIAGNOSIS — G47.33 OBSTRUCTIVE SLEEP APNEA (ADULT) (PEDIATRIC): ICD-10-CM

## 2025-06-20 DIAGNOSIS — Z90.89 ACQUIRED ABSENCE OF OTHER ORGANS: Chronic | ICD-10-CM

## 2025-06-20 PROCEDURE — 95800 SLP STDY UNATTENDED: CPT | Mod: 26

## 2025-06-20 PROCEDURE — 95806 SLEEP STUDY UNATT&RESP EFFT: CPT

## 2025-06-25 DIAGNOSIS — G47.33 OBSTRUCTIVE SLEEP APNEA (ADULT) (PEDIATRIC): ICD-10-CM

## 2025-06-27 ENCOUNTER — NON-APPOINTMENT (OUTPATIENT)
Age: 65
End: 2025-06-27

## 2025-08-11 ENCOUNTER — APPOINTMENT (OUTPATIENT)
Dept: PULMONOLOGY | Facility: CLINIC | Age: 65
End: 2025-08-11
Payer: MEDICARE

## 2025-08-11 VITALS
DIASTOLIC BLOOD PRESSURE: 70 MMHG | HEIGHT: 68 IN | RESPIRATION RATE: 15 BRPM | OXYGEN SATURATION: 97 % | SYSTOLIC BLOOD PRESSURE: 110 MMHG | HEART RATE: 95 BPM | WEIGHT: 250 LBS | BODY MASS INDEX: 37.89 KG/M2

## 2025-08-11 DIAGNOSIS — G47.33 OBSTRUCTIVE SLEEP APNEA (ADULT) (PEDIATRIC): ICD-10-CM

## 2025-08-11 DIAGNOSIS — R09.82 POSTNASAL DRIP: ICD-10-CM

## 2025-08-11 DIAGNOSIS — R91.1 SOLITARY PULMONARY NODULE: ICD-10-CM

## 2025-08-11 PROCEDURE — G2211 COMPLEX E/M VISIT ADD ON: CPT

## 2025-08-11 PROCEDURE — 99214 OFFICE O/P EST MOD 30 MIN: CPT
